# Patient Record
Sex: MALE | Race: WHITE | NOT HISPANIC OR LATINO | Employment: FULL TIME | URBAN - METROPOLITAN AREA
[De-identification: names, ages, dates, MRNs, and addresses within clinical notes are randomized per-mention and may not be internally consistent; named-entity substitution may affect disease eponyms.]

---

## 2018-08-10 ENCOUNTER — OFFICE VISIT (OUTPATIENT)
Dept: FAMILY MEDICINE CLINIC | Facility: CLINIC | Age: 23
End: 2018-08-10
Payer: COMMERCIAL

## 2018-08-10 ENCOUNTER — LAB (OUTPATIENT)
Dept: LAB | Facility: CLINIC | Age: 23
End: 2018-08-10
Payer: COMMERCIAL

## 2018-08-10 VITALS
HEART RATE: 61 BPM | OXYGEN SATURATION: 97 % | BODY MASS INDEX: 25.15 KG/M2 | TEMPERATURE: 97.7 F | DIASTOLIC BLOOD PRESSURE: 64 MMHG | WEIGHT: 169.8 LBS | SYSTOLIC BLOOD PRESSURE: 110 MMHG | HEIGHT: 69 IN

## 2018-08-10 DIAGNOSIS — Z13.1 DIABETES MELLITUS SCREENING: ICD-10-CM

## 2018-08-10 DIAGNOSIS — R25.1 OCCASIONAL TREMORS: Primary | ICD-10-CM

## 2018-08-10 DIAGNOSIS — R10.9 ABDOMINAL CRAMPS: ICD-10-CM

## 2018-08-10 DIAGNOSIS — R25.1 OCCASIONAL TREMORS: ICD-10-CM

## 2018-08-10 DIAGNOSIS — Z13.220 NEED FOR LIPID SCREENING: ICD-10-CM

## 2018-08-10 LAB
ALBUMIN SERPL BCP-MCNC: 4.1 G/DL (ref 3.5–5)
ALP SERPL-CCNC: 54 U/L (ref 46–116)
ALT SERPL W P-5'-P-CCNC: 23 U/L (ref 12–78)
ANION GAP SERPL CALCULATED.3IONS-SCNC: 8 MMOL/L (ref 4–13)
AST SERPL W P-5'-P-CCNC: 11 U/L (ref 5–45)
BASOPHILS # BLD AUTO: 0.02 THOUSANDS/ΜL (ref 0–0.1)
BASOPHILS NFR BLD AUTO: 0 % (ref 0–1)
BILIRUB SERPL-MCNC: 0.47 MG/DL (ref 0.2–1)
BUN SERPL-MCNC: 13 MG/DL (ref 5–25)
CALCIUM SERPL-MCNC: 9.3 MG/DL (ref 8.3–10.1)
CHLORIDE SERPL-SCNC: 106 MMOL/L (ref 100–108)
CHOLEST SERPL-MCNC: 142 MG/DL (ref 50–200)
CO2 SERPL-SCNC: 27 MMOL/L (ref 21–32)
CREAT SERPL-MCNC: 0.9 MG/DL (ref 0.6–1.3)
EOSINOPHIL # BLD AUTO: 0.23 THOUSAND/ΜL (ref 0–0.61)
EOSINOPHIL NFR BLD AUTO: 3 % (ref 0–6)
ERYTHROCYTE [DISTWIDTH] IN BLOOD BY AUTOMATED COUNT: 12.3 % (ref 11.6–15.1)
GFR SERPL CREATININE-BSD FRML MDRD: 121 ML/MIN/1.73SQ M
GLUCOSE P FAST SERPL-MCNC: 90 MG/DL (ref 65–99)
HCT VFR BLD AUTO: 45.6 % (ref 36.5–49.3)
HDLC SERPL-MCNC: 52 MG/DL (ref 40–60)
HGB BLD-MCNC: 15.1 G/DL (ref 12–17)
IMM GRANULOCYTES # BLD AUTO: 0.01 THOUSAND/UL (ref 0–0.2)
IMM GRANULOCYTES NFR BLD AUTO: 0 % (ref 0–2)
LDLC SERPL CALC-MCNC: 74 MG/DL (ref 0–100)
LYMPHOCYTES # BLD AUTO: 2.29 THOUSANDS/ΜL (ref 0.6–4.47)
LYMPHOCYTES NFR BLD AUTO: 34 % (ref 14–44)
MCH RBC QN AUTO: 30.1 PG (ref 26.8–34.3)
MCHC RBC AUTO-ENTMCNC: 33.1 G/DL (ref 31.4–37.4)
MCV RBC AUTO: 91 FL (ref 82–98)
MONOCYTES # BLD AUTO: 0.56 THOUSAND/ΜL (ref 0.17–1.22)
MONOCYTES NFR BLD AUTO: 8 % (ref 4–12)
NEUTROPHILS # BLD AUTO: 3.59 THOUSANDS/ΜL (ref 1.85–7.62)
NEUTS SEG NFR BLD AUTO: 55 % (ref 43–75)
NONHDLC SERPL-MCNC: 90 MG/DL
NRBC BLD AUTO-RTO: 0 /100 WBCS
PLATELET # BLD AUTO: 258 THOUSANDS/UL (ref 149–390)
PMV BLD AUTO: 10.3 FL (ref 8.9–12.7)
POTASSIUM SERPL-SCNC: 3.8 MMOL/L (ref 3.5–5.3)
PROT SERPL-MCNC: 7.4 G/DL (ref 6.4–8.2)
RBC # BLD AUTO: 5.01 MILLION/UL (ref 3.88–5.62)
SODIUM SERPL-SCNC: 141 MMOL/L (ref 136–145)
TRIGL SERPL-MCNC: 78 MG/DL
TSH SERPL DL<=0.05 MIU/L-ACNC: 2.3 UIU/ML
WBC # BLD AUTO: 6.7 THOUSAND/UL (ref 4.31–10.16)

## 2018-08-10 PROCEDURE — 3008F BODY MASS INDEX DOCD: CPT | Performed by: FAMILY MEDICINE

## 2018-08-10 PROCEDURE — 99204 OFFICE O/P NEW MOD 45 MIN: CPT | Performed by: FAMILY MEDICINE

## 2018-08-10 PROCEDURE — 36415 COLL VENOUS BLD VENIPUNCTURE: CPT

## 2018-08-10 PROCEDURE — 80061 LIPID PANEL: CPT

## 2018-08-10 PROCEDURE — 85025 COMPLETE CBC W/AUTO DIFF WBC: CPT

## 2018-08-10 PROCEDURE — 84443 ASSAY THYROID STIM HORMONE: CPT

## 2018-08-10 PROCEDURE — 80053 COMPREHEN METABOLIC PANEL: CPT

## 2018-08-10 RX ORDER — ALUMINUM ZIRCONIUM OCTACHLOROHYDREX GLY 16 G/100G
1 GEL TOPICAL DAILY
Qty: 30 CAPSULE | Refills: 1 | Status: SHIPPED | OUTPATIENT
Start: 2018-08-10 | End: 2018-09-09

## 2018-08-10 NOTE — PROGRESS NOTES
Assessment/Plan:    No problem-specific Assessment & Plan notes found for this encounter  Diagnoses and all orders for this visit:    Occasional tremors  Unclear etiology possibly related to familial tremors  After discussing with patient will order blood work at this time  He is not interested in medications for it at this time  He says the tremor is not bothersome to him at this time  -     CBC and differential; Future  -     TSH baseline; Future    Abdominal cramps  Possibly related to IBs  Advised to try probiotics  -     bifidobacterium infantis (ALIGN) capsule; Take 1 capsule by mouth daily for 30 days    Diabetes mellitus screening  -     Comprehensive metabolic panel; Future    Need for lipid screening  -     Lipid panel; Future      follow-up as needed  Subjective:      Patient ID: Armen Harrington is a 25 y o  male  Patient is here to establish care  He said he has been having tremor of both hands have a past several months  He denies any family history of essential tremor or Parkinson's disease  He says that the tremor is not bothersome to him however his mom is worried about it  Also he has been having abdominal cramps that come and go  He denies any eye changes in bowel habits related to this  He denies any constipation diarrhea blood in the stool associated with it  He denies any nausea vomiting associated with it  The following portions of the patient's history were reviewed and updated as appropriate:   He  has no past medical history on file  He   Patient Active Problem List    Diagnosis Date Noted    Occasional tremors 08/10/2018    Abdominal cramps 08/10/2018    Diabetes mellitus screening 08/10/2018    Need for lipid screening 08/10/2018     He  has no past surgical history on file  His family history includes Heart disease in his father; No Known Problems in his mother  He  reports that he has been smoking Cigarettes    He has a 2 50 pack-year smoking history  He has never used smokeless tobacco  His alcohol and drug histories are not on file  Current Outpatient Prescriptions   Medication Sig Dispense Refill    bifidobacterium infantis (ALIGN) capsule Take 1 capsule by mouth daily for 30 days 30 capsule 1     No current facility-administered medications for this visit  No current outpatient prescriptions on file prior to visit  No current facility-administered medications on file prior to visit  He has No Known Allergies       Review of Systems   Constitutional: Negative for activity change, appetite change, fatigue and fever  HENT: Negative for congestion and ear discharge  Respiratory: Negative for cough and shortness of breath  Cardiovascular: Negative for chest pain and palpitations  Gastrointestinal: Positive for abdominal pain  Negative for diarrhea and nausea  Musculoskeletal: Negative for arthralgias and back pain  Skin: Negative for color change and rash  Neurological: Positive for tremors  Negative for dizziness and headaches  Psychiatric/Behavioral: Negative for agitation and behavioral problems  Objective:      /64   Pulse 61   Temp 97 7 °F (36 5 °C)   Ht 5' 9" (1 753 m)   Wt 77 kg (169 lb 12 8 oz)   SpO2 97%   BMI 25 08 kg/m²          Physical Exam   Constitutional: He is oriented to person, place, and time  He appears well-developed and well-nourished  No distress  Eyes: Pupils are equal, round, and reactive to light  No scleral icterus  Cardiovascular: Normal rate, regular rhythm and normal heart sounds  No murmur heard  Pulmonary/Chest: Effort normal and breath sounds normal  No respiratory distress  He has no wheezes  Abdominal: Soft  Bowel sounds are normal  He exhibits no distension  There is no tenderness  Neurological: He is alert and oriented to person, place, and time  Slight tremor of right hand noted  Skin: Skin is warm and dry  No rash noted  He is not diaphoretic  Psychiatric: He has a normal mood and affect

## 2019-11-02 ENCOUNTER — APPOINTMENT (EMERGENCY)
Dept: RADIOLOGY | Facility: HOSPITAL | Age: 24
End: 2019-11-02
Payer: COMMERCIAL

## 2019-11-02 ENCOUNTER — HOSPITAL ENCOUNTER (EMERGENCY)
Facility: HOSPITAL | Age: 24
Discharge: HOME | End: 2019-11-02
Attending: EMERGENCY MEDICINE | Admitting: EMERGENCY MEDICINE
Payer: COMMERCIAL

## 2019-11-02 VITALS
HEART RATE: 70 BPM | SYSTOLIC BLOOD PRESSURE: 124 MMHG | RESPIRATION RATE: 18 BRPM | BODY MASS INDEX: 24.44 KG/M2 | DIASTOLIC BLOOD PRESSURE: 80 MMHG | HEIGHT: 69 IN | OXYGEN SATURATION: 97 % | WEIGHT: 165 LBS | TEMPERATURE: 97.3 F

## 2019-11-02 DIAGNOSIS — S89.91XA RIGHT KNEE INJURY, INITIAL ENCOUNTER: Primary | ICD-10-CM

## 2019-11-02 PROCEDURE — 99283 EMERGENCY DEPT VISIT LOW MDM: CPT

## 2019-11-02 PROCEDURE — 73564 X-RAY EXAM KNEE 4 OR MORE: CPT

## 2019-11-02 PROCEDURE — 99284 EMERGENCY DEPT VISIT MOD MDM: CPT | Performed by: PHYSICIAN ASSISTANT

## 2019-11-03 NOTE — ED PROVIDER NOTES
History  Chief Complaint   Patient presents with    Knee Pain     hx dislocation - states feels like it dislocated but feels like it popped back in; right knee     A 51-year-old male here for right knee injury  He has history of patellar dislocations and today provided on the right knee feeling a pop and then fell to the ground onto the knee  States his knee cap "went sideways"  Pain since then  Ambulance was initially called and when they arrived they straightened out his leg which reduced his patella  He is feeling better but continues to have pain and swelling prompting his visit  Denies any numbness tingling or weakness  He still has full range of motion of the knee albeit pain in doing so  History provided by:  Patient   used: No    Knee Pain   Location:  Knee  Time since incident:  1 hour  Injury: yes    Mechanism of injury comment:  External rotation of the knee  Knee location:  R knee  Pain details:     Quality:  Aching    Radiates to:  Does not radiate    Severity:  Moderate    Onset quality:  Sudden    Timing:  Constant    Progression:  Improving  Chronicity:  New  Dislocation: yes (Patella)    Foreign body present:  No foreign bodies  Prior injury to area:  Yes (Prior patellar dislocation)  Relieved by:  Rest  Worsened by:  Bearing weight, extension and flexion  Ineffective treatments:  None tried  Associated symptoms: no back pain, no decreased ROM, no fatigue, no fever, no itching, no muscle weakness, no neck pain, no numbness, no stiffness, no swelling and no tingling    Risk factors: no concern for non-accidental trauma, no frequent fractures, no known bone disorder, no obesity and no recent illness        None       History reviewed  No pertinent past medical history  History reviewed  No pertinent surgical history      Family History   Problem Relation Age of Onset    No Known Problems Mother     Heart disease Father      I have reviewed and agree with the history as documented  Social History     Tobacco Use    Smoking status: Current Every Day Smoker     Packs/day: 0 50     Years: 5 00     Pack years: 2 50     Types: Cigarettes    Smokeless tobacco: Never Used   Substance Use Topics    Alcohol use: Yes     Alcohol/week: 6 0 standard drinks     Types: 6 Standard drinks or equivalent per week     Frequency: Never    Drug use: Not on file        Review of Systems   Constitutional: Negative for activity change, appetite change, chills, diaphoresis, fatigue, fever and unexpected weight change  HENT: Negative for congestion, rhinorrhea, sinus pressure, sore throat and trouble swallowing  Eyes: Negative for photophobia and visual disturbance  Respiratory: Negative for apnea, cough, choking, chest tightness, shortness of breath, wheezing and stridor  Cardiovascular: Negative for chest pain, palpitations and leg swelling  Gastrointestinal: Negative for abdominal distention, abdominal pain, blood in stool, constipation, diarrhea, nausea and vomiting  Genitourinary: Negative for decreased urine volume, difficulty urinating, dysuria, enuresis, flank pain, frequency, hematuria and urgency  Musculoskeletal: Negative for arthralgias, back pain, myalgias, neck pain, neck stiffness and stiffness  Skin: Negative for color change, itching, pallor, rash and wound  Allergic/Immunologic: Negative  Neurological: Negative for dizziness, tremors, syncope, weakness, light-headedness, numbness and headaches  Hematological: Negative  Psychiatric/Behavioral: Negative  All other systems reviewed and are negative  Physical Exam  Physical Exam   Constitutional: He is oriented to person, place, and time  He appears well-developed and well-nourished  Non-toxic appearance  He does not have a sickly appearance  He does not appear ill  No distress  HENT:   Head: Normocephalic and atraumatic  Eyes: Pupils are equal, round, and reactive to light   EOM and lids are normal    Neck: Normal range of motion  Neck supple  Cardiovascular: Normal rate, regular rhythm, S1 normal, S2 normal, normal heart sounds, intact distal pulses and normal pulses  Exam reveals no gallop, no distant heart sounds, no friction rub and no decreased pulses  No murmur heard  Pulses:       Radial pulses are 2+ on the right side, and 2+ on the left side  Pulmonary/Chest: Effort normal and breath sounds normal  No accessory muscle usage  No apnea, no tachypnea and no bradypnea  No respiratory distress  He has no decreased breath sounds  He has no wheezes  He has no rhonchi  He has no rales  Abdominal: Soft  Normal appearance  He exhibits no distension  There is no tenderness  There is no rigidity, no rebound and no guarding  Musculoskeletal: Normal range of motion  He exhibits no edema or deformity  Right knee: He exhibits bony tenderness  He exhibits normal range of motion, no swelling, no effusion and no ecchymosis  Tenderness found  Medial joint line and MCL tenderness noted  No lateral joint line, no LCL and no patellar tendon tenderness noted  Legs:  Full range of motion of the right knee including full flexion and extension against resistance  No swelling  No effusion  Neurovascular intact distally  Neurological: He is alert and oriented to person, place, and time  No cranial nerve deficit  GCS eye subscore is 4  GCS verbal subscore is 5  GCS motor subscore is 6  Skin: Skin is warm, dry and intact  No rash noted  He is not diaphoretic  No erythema  No pallor  Psychiatric: His speech is normal    Nursing note and vitals reviewed        Vital Signs  ED Triage Vitals [11/02/19 2223]   Temperature Pulse Respirations Blood Pressure SpO2   (!) 97 3 °F (36 3 °C) 70 18 124/80 97 %      Temp Source Heart Rate Source Patient Position - Orthostatic VS BP Location FiO2 (%)   Oral Monitor Sitting Right arm --      Pain Score       5           Vitals:    11/02/19 2223   BP: 124/80 Pulse: 70   Patient Position - Orthostatic VS: Sitting         Visual Acuity      ED Medications  Medications - No data to display    Diagnostic Studies  Results Reviewed     None                 XR knee 4+ vw right injury    (Results Pending)              Procedures  Procedures       ED Course                               MDM  Number of Diagnoses or Management Options  Right knee injury, initial encounter: new and requires workup  Diagnosis management comments: Differential diagnosis including but not limited to: sprain, strain, fracture, dislocation, contusion  Plan: XR  dispo pending  Amount and/or Complexity of Data Reviewed  Tests in the radiology section of CPT®: ordered and reviewed  Independent visualization of images, tracings, or specimens: yes    Risk of Complications, Morbidity, and/or Mortality  Presenting problems: low  Management options: low  General comments: 26 yo with right knee injury  XR normal  Based on what he describes as well as mechanism described (external rotation, minor mechanism), and previous history of similar this was likely a patellar dislocation which spontaneously reduced  He's feeling better  Normal neurovascular exam with 2+ DP pulse  Doubt knee dislocation  Recommended RICE  Knee immobilizer and crutches provided  NVI afterwards  Recommended f/u with ortho  Return parameters provided  Pt understands and agrees with plan  Patient Progress  Patient progress: stable      Disposition  Final diagnoses:   Right knee injury, initial encounter     Time reflects when diagnosis was documented in both MDM as applicable and the Disposition within this note     Time User Action Codes Description Comment    11/2/2019 10:42 PM Valeria Ranks Add [[id number]] Right knee injury, initial encounter       ED Disposition     ED Disposition Condition Date/Time Comment    Discharge Stable Sat Nov 2, 2019 10:42 PM Anikta Tirado discharge to home/self care              Follow-up Information     Follow up With Specialties Details Why Contact Info Additional [address] [facility] Orthopedic Surgery Call   [address] [facility]  [id number] [facility], [address], [place], [place], [address] [phone]          Patient's Medications    No medications on file         ED Provider  Electronically Signed by           Karen Maharaj PA-C  11/02/19 4230

## 2019-11-11 ENCOUNTER — OFFICE VISIT (OUTPATIENT)
Dept: OBGYN CLINIC | Facility: CLINIC | Age: 24
End: 2019-11-11
Payer: COMMERCIAL

## 2019-11-11 VITALS
SYSTOLIC BLOOD PRESSURE: 129 MMHG | HEART RATE: 102 BPM | BODY MASS INDEX: 24.44 KG/M2 | HEIGHT: 69 IN | WEIGHT: 165 LBS | DIASTOLIC BLOOD PRESSURE: 74 MMHG

## 2019-11-11 DIAGNOSIS — S89.91XA RIGHT KNEE INJURY, INITIAL ENCOUNTER: ICD-10-CM

## 2019-11-11 DIAGNOSIS — S83.004A PATELLAR DISLOCATION, RIGHT, INITIAL ENCOUNTER: Primary | ICD-10-CM

## 2019-11-11 PROCEDURE — 99243 OFF/OP CNSLTJ NEW/EST LOW 30: CPT | Performed by: FAMILY MEDICINE

## 2019-11-11 NOTE — LETTER
November 11, 2019     Ariana Blackman, [address]  [address] [facility]  [address]    Patient: Brooklyn Chandra   YOB: 1995   Date of Visit: 11/11/2019       Dear Dr Miroslava Young:    Thank you for referring Micah Caldwell to me for evaluation  Below are my notes for this consultation  If you have questions, please do not hesitate to call me  I look forward to following your patient along with you  Sincerely,        [employer], DO        CC: No Recipients  [employer], DO  11/11/2019  5:18 PM  Sign at close encounter  Assessment/Plan:  Assessment/Plan   Diagnoses and all orders for this visit:    Patellar dislocation, right, initial encounter  -     MRI knee right  wo contrast; Future    Right knee injury, initial encounter  -     MRI knee right  wo contrast; Future      28-year-old active male with right knee pain and swelling from twisting injury on 11/02/2019  Discussed with patient physical exam, radiographs, impression and plan  X-rays of the right knee are unremarkable for acute osseous abnormality  Physical exam is noted for effusion of the knee with tenderness of the MPFL, patellar undersurface, and medial femoral condyle  He has range of motion limited to extension -5° and flexion to 90°  There is positive Donna's at the medial aspect knee positive patellar apprehension, and positive patellar inhibition and grind  He has normal dorsalis pedis pulse and sensation in the right lower extremity  He is status post patellar dislocation, with this event being his 4th dislocation  At this time I will refer him for MRI of the knee to evaluate for internal derangement including articular cartilage defect/loose body, as invasive management may be warranted  He is to continue use of immobilizer but may bear weight as tolerated  He will follow up after getting MRI done  Subjective:   Patient ID: Brooklyn Chandra is a 25 y o  male    Chief Complaint   Patient presents with   [facility] Right Knee - Follow-up, Pain, Swelling       24-year-old active male presents for evaluation of right knee pain and swelling of onset from twisting injury on 11/02/2019  He reports that while walking through [place] he planted his right lower extremity and pivoted and his knee twisted and gave out  He felt his kneecap pop out to the lateral aspect of the knee and he fell to the ground  He has pain that was described as sudden onset, sharp, localized to the knee but worse at the lateral aspect, severe in intensity, radiating distally along the lateral aspect of lower leg, associated with swelling, worse with direct pressure and attempt of bending the knee, and improved with stability  EMS services were contacted and upon transferring him his knee cap reduced  He was evaluated in emergency room where x-ray evaluation unremarkable  He was provided with knee immobilizer, given crutches, and referred to orthopedic care  He reports having previous dislocations with this being his 4th event, and states that during 1 event he had to be sedated in order for the kneecap to be reduced  He reports that with the knee immobilized pain is controlled  Knee Pain   This is a new problem  The current episode started 1 to 4 weeks ago  The problem occurs intermittently  The problem has been gradually improving  Associated symptoms include arthralgias and joint swelling  Pertinent negatives include no abdominal pain, chest pain, chills, fever, numbness, rash, sore throat or weakness  The symptoms are aggravated by bending  He has tried rest, NSAIDs and immobilization for the symptoms  The treatment provided mild relief  The following portions of the patient's history were reviewed and updated as appropriate: He  has no past medical history on file  He  has no past surgical history on file  His family history includes Heart disease in his father; No Known Problems in his mother    He  reports that he has been smoking cigarettes  He has a 2 50 pack-year smoking history  He has never used smokeless tobacco  He reports that he drinks about 6 0 standard drinks of alcohol per week  His drug history is not on file  He has No Known Allergies       Review of Systems   Constitutional: Negative for chills and fever  HENT: Negative for sore throat  Eyes: Negative for visual disturbance  Respiratory: Negative for shortness of breath  Cardiovascular: Negative for chest pain  Gastrointestinal: Negative for abdominal pain  Genitourinary: Negative for flank pain  Musculoskeletal: Positive for arthralgias and joint swelling  Skin: Negative for rash and wound  Neurological: Negative for weakness and numbness  Hematological: Does not bruise/bleed easily  Psychiatric/Behavioral: Negative for self-injury  Objective:  Vitals:    11/11/19 1335   BP: 129/74   Pulse: 102   Weight: 74 8 kg (165 lb)   Height: 5' 9" (1 753 m)     Right Ankle Exam     Muscle Strength   Dorsiflexion:  5/5  Plantar flexion:  5/5    Other   Pulse: present       Right Knee Exam     Muscle Strength   The patient has normal right knee strength  Tenderness   The patient is experiencing tenderness in the medial retinaculum and medial joint line (Patellar undersurface, medial femoral condyle)  Range of Motion   Extension: -5   Flexion: 90     Tests   Donna:  Medial - positive   Varus: negative Valgus: negative  Patellar apprehension: positive    Other   Swelling: moderate  Effusion: effusion present    Comments:  Positive patellar inhibition and grind      Right Hip Exam     Muscle Strength   Flexion: 5/5     Tests   ZULEIKA: negative    Comments:  Negative FADDIR          Observations     Right Knee   Positive for effusion  Strength/Myotome Testing     Right Ankle/Foot   Dorsiflexion: 5  Plantar flexion: 5      Physical Exam   Constitutional: He is oriented to person, place, and time  He appears well-developed  No distress     HENT: Head: Normocephalic and atraumatic  Eyes: Conjunctivae are normal    Neck: No tracheal deviation present  Cardiovascular: Normal rate  Pulmonary/Chest: Effort normal  No respiratory distress  Abdominal: He exhibits no distension  Musculoskeletal:        Right knee: He exhibits effusion  Neurological: He is alert and oriented to person, place, and time  Skin: Skin is warm and dry  Psychiatric: He has a normal mood and affect  His behavior is normal    Nursing note and vitals reviewed  I have personally reviewed pertinent films in PACS and my interpretation is No acute osseous abnormality of the right knee

## 2019-11-11 NOTE — LETTER
November 11, 2019     Patient: Robin Heimlich   YOB: 1995   Date of Visit: 11/11/2019       To Whom it May Concern:    Quinn Mayfieldcolin is under my professional care  He was seen in my office on 11/11/2019  He may work with restrictions:  -He is restricted to sedentary work  He is being referred for MRI and will be re-assessed after  If you have any questions or concerns, please don't hesitate to call           Sincerely,          [employer], DO        CC: No Recipients

## 2019-11-11 NOTE — PROGRESS NOTES
Assessment/Plan:  Assessment/Plan   Diagnoses and all orders for this visit:    Patellar dislocation, right, initial encounter  -     MRI knee right  wo contrast; Future    Right knee injury, initial encounter  -     MRI knee right  wo contrast; Future      49-year-old active male with right knee pain and swelling from twisting injury on 11/02/2019  Discussed with patient physical exam, radiographs, impression and plan  X-rays of the right knee are unremarkable for acute osseous abnormality  Physical exam is noted for effusion of the knee with tenderness of the MPFL, patellar undersurface, and medial femoral condyle  He has range of motion limited to extension -5° and flexion to 90°  There is positive Donna's at the medial aspect knee positive patellar apprehension, and positive patellar inhibition and grind  He has normal dorsalis pedis pulse and sensation in the right lower extremity  He is status post patellar dislocation, with this event being his 4th dislocation  At this time I will refer him for MRI of the knee to evaluate for internal derangement including articular cartilage defect/loose body, as invasive management may be warranted  He is to continue use of immobilizer but may bear weight as tolerated  He will follow up after getting MRI done  Subjective:   Patient ID: Leland Soto is a 25 y o  male  Chief Complaint   Patient presents with    Right Knee - Follow-up, Pain, Swelling       49-year-old active male presents for evaluation of right knee pain and swelling of onset from twisting injury on 11/02/2019  He reports that while walking through [place] he planted his right lower extremity and pivoted and his knee twisted and gave out  He felt his kneecap pop out to the lateral aspect of the knee and he fell to the ground    He has pain that was described as sudden onset, sharp, localized to the knee but worse at the lateral aspect, severe in intensity, radiating distally along the lateral aspect of lower leg, associated with swelling, worse with direct pressure and attempt of bending the knee, and improved with stability  EMS services were contacted and upon transferring him his knee cap reduced  He was evaluated in emergency room where x-ray evaluation unremarkable  He was provided with knee immobilizer, given crutches, and referred to orthopedic care  He reports having previous dislocations with this being his 4th event, and states that during 1 event he had to be sedated in order for the kneecap to be reduced  He reports that with the knee immobilized pain is controlled  Knee Pain   This is a new problem  The current episode started 1 to 4 weeks ago  The problem occurs intermittently  The problem has been gradually improving  Associated symptoms include arthralgias and joint swelling  Pertinent negatives include no abdominal pain, chest pain, chills, fever, numbness, rash, sore throat or weakness  The symptoms are aggravated by bending  He has tried rest, NSAIDs and immobilization for the symptoms  The treatment provided mild relief  The following portions of the patient's history were reviewed and updated as appropriate: He  has no past medical history on file  He  has no past surgical history on file  His family history includes Heart disease in his father; No Known Problems in his mother  He  reports that he has been smoking cigarettes  He has a 2 50 pack-year smoking history  He has never used smokeless tobacco  He reports that he drinks about 6 0 standard drinks of alcohol per week  His drug history is not on file  He has No Known Allergies       Review of Systems   Constitutional: Negative for chills and fever  HENT: Negative for sore throat  Eyes: Negative for visual disturbance  Respiratory: Negative for shortness of breath  Cardiovascular: Negative for chest pain  Gastrointestinal: Negative for abdominal pain     Genitourinary: Negative for flank pain    Musculoskeletal: Positive for arthralgias and joint swelling  Skin: Negative for rash and wound  Neurological: Negative for weakness and numbness  Hematological: Does not bruise/bleed easily  Psychiatric/Behavioral: Negative for self-injury  Objective:  Vitals:    11/11/19 1335   BP: 129/74   Pulse: 102   Weight: 74 8 kg (165 lb)   Height: 5' 9" (1 753 m)     Right Ankle Exam     Muscle Strength   Dorsiflexion:  5/5  Plantar flexion:  5/5    Other   Pulse: present       Right Knee Exam     Muscle Strength   The patient has normal right knee strength  Tenderness   The patient is experiencing tenderness in the medial retinaculum and medial joint line (Patellar undersurface, medial femoral condyle)  Range of Motion   Extension: -5   Flexion: 90     Tests   Donna:  Medial - positive   Varus: negative Valgus: negative  Patellar apprehension: positive    Other   Swelling: moderate  Effusion: effusion present    Comments:  Positive patellar inhibition and grind      Right Hip Exam     Muscle Strength   Flexion: 5/5     Tests   ZULEIKA: negative    Comments:  Negative FADDIR          Observations     Right Knee   Positive for effusion  Strength/Myotome Testing     Right Ankle/Foot   Dorsiflexion: 5  Plantar flexion: 5      Physical Exam   Constitutional: He is oriented to person, place, and time  He appears well-developed  No distress  HENT:   Head: Normocephalic and atraumatic  Eyes: Conjunctivae are normal    Neck: No tracheal deviation present  Cardiovascular: Normal rate  Pulmonary/Chest: Effort normal  No respiratory distress  Abdominal: He exhibits no distension  Musculoskeletal:        Right knee: He exhibits effusion  Neurological: He is alert and oriented to person, place, and time  Skin: Skin is warm and dry  Psychiatric: He has a normal mood and affect  His behavior is normal    Nursing note and vitals reviewed        I have personally reviewed pertinent films in PACS and my interpretation is No acute osseous abnormality of the right knee

## 2019-11-13 ENCOUNTER — HOSPITAL ENCOUNTER (OUTPATIENT)
Dept: RADIOLOGY | Facility: IMAGING CENTER | Age: 24
Discharge: HOME | End: 2019-11-13
Attending: FAMILY MEDICINE
Payer: COMMERCIAL

## 2019-11-13 DIAGNOSIS — S89.91XA RIGHT KNEE INJURY, INITIAL ENCOUNTER: ICD-10-CM

## 2019-11-13 DIAGNOSIS — S83.004A PATELLAR DISLOCATION, RIGHT, INITIAL ENCOUNTER: ICD-10-CM

## 2019-11-13 PROCEDURE — 73721 MRI JNT OF LWR EXTRE W/O DYE: CPT

## 2019-11-18 ENCOUNTER — OFFICE VISIT (OUTPATIENT)
Dept: OBGYN CLINIC | Facility: CLINIC | Age: 24
End: 2019-11-18
Payer: COMMERCIAL

## 2019-11-18 VITALS
DIASTOLIC BLOOD PRESSURE: 78 MMHG | BODY MASS INDEX: 28.14 KG/M2 | HEART RATE: 72 BPM | HEIGHT: 69 IN | WEIGHT: 190 LBS | SYSTOLIC BLOOD PRESSURE: 122 MMHG

## 2019-11-18 DIAGNOSIS — S83.004D PATELLAR DISLOCATION, RIGHT, SUBSEQUENT ENCOUNTER: Primary | ICD-10-CM

## 2019-11-18 DIAGNOSIS — T14.8XXA CONTUSION OF BONE: ICD-10-CM

## 2019-11-18 DIAGNOSIS — M25.361 PATELLAR INSTABILITY OF RIGHT KNEE: ICD-10-CM

## 2019-11-18 DIAGNOSIS — S83.8X1D SPRAIN OF OTHER LIGAMENT OF RIGHT KNEE, SUBSEQUENT ENCOUNTER: ICD-10-CM

## 2019-11-18 PROCEDURE — 99213 OFFICE O/P EST LOW 20 MIN: CPT | Performed by: FAMILY MEDICINE

## 2019-11-18 RX ORDER — NAPROXEN 500 MG/1
500 TABLET ORAL 2 TIMES DAILY WITH MEALS
Qty: 30 TABLET | Refills: 0 | Status: SHIPPED | OUTPATIENT
Start: 2019-11-18

## 2019-11-18 NOTE — PROGRESS NOTES
Assessment/Plan:  Assessment/Plan   Diagnoses and all orders for this visit:    Patellar dislocation, right, subsequent encounter  -     Brace  -     Ambulatory referral to Physical Therapy; Future    Sprain of other ligament of right knee, subsequent encounter  -     naproxen (NAPROSYN) 500 mg tablet; Take 1 tablet (500 mg total) by mouth 2 (two) times a day with meals  -     Ambulatory referral to Physical Therapy; Future    Contusion of bone  -     naproxen (NAPROSYN) 500 mg tablet; Take 1 tablet (500 mg total) by mouth 2 (two) times a day with meals  -     Ambulatory referral to Physical Therapy; Future    Patellar instability of right knee  -     Ambulatory referral to Physical Therapy; Future        49-year-old active male status post right knee patellar dislocation from twisting injury on 11/02/2019  Discussed with patient MRI results, impression and plan  MRI of the right knee is noted for bone contusions lateral femoral condyle and medial aspect of the patella, with diffuse thickening of the medial patellofemoral ligament and partial tear of the patellar and femoral attachments  He does have shallow femoral trochlea and Wiberg type 2 patella  Physical exam noted for mild effusion around the knee  There is only mild tenderness upon palpation of the lateral femoral condyle and over the MPFL  He has extension limited to -5°  There is mild patellar apprehension  Clinical impression is already improving  I discussed regimen of anti-inflammatory, bracing, and physical therapy to which he agreed  He is to take naproxen 500 mg twice daily food consistently 2 weeks, take tumeric 500 mg twice daily, I provided with patellar stabilizing knee brace, he is to start physical therapy as soon as possible and do home exercises as directed, and he will follow up in 5 weeks at which point he will be re-evaluated  Subjective:   Patient ID: Hien Sprain is a 25 y o  male    Chief Complaint   Patient presents with  Right Knee - Pain, Swelling, Follow-up     70-year-old male following up for right knee pain after sustaining patellar dislocation on 11/02/2019  He was last seen 1 week ago at which point he was referred for MRI of the knee and advised to continue with knee immobilizer  He reports improvement since his last visit  Pain described as generalized to the knee but worse at the lateral and medial aspects, intermittent, achy and sore, nonradiating, associated swelling, worse with activity, and improved with rest   He has been better able to bear weight and ambulate, and bend the knee  He has not had any new injury since his last visit  Knee Pain   This is a new problem  The current episode started 1 to 4 weeks ago  The problem occurs intermittently  The problem has been gradually improving  Associated symptoms include arthralgias and joint swelling  Pertinent negatives include no numbness or weakness  The symptoms are aggravated by twisting, standing and walking  He has tried rest and immobilization for the symptoms  The treatment provided mild relief  Review of Systems   Musculoskeletal: Positive for arthralgias and joint swelling  Neurological: Negative for weakness and numbness  Objective:  Vitals:    11/18/19 1337   BP: 122/78   Pulse: 72   Weight: 86 2 kg (190 lb)   Height: 5' 9" (1 753 m)     Right Knee Exam     Muscle Strength   The patient has normal right knee strength  Tenderness   The patient is experiencing tenderness in the medial retinaculum and lateral joint line (Lateral femoral condyle, patellar undersurface)  Range of Motion   Extension: -5     Tests   Patellar apprehension: positive    Other   Swelling: mild            Physical Exam   Constitutional: He is oriented to person, place, and time  He appears well-developed  No distress  HENT:   Head: Normocephalic and atraumatic  Eyes: Conjunctivae are normal    Neck: No tracheal deviation present     Cardiovascular: Normal rate  Pulmonary/Chest: Effort normal  No respiratory distress  Abdominal: He exhibits no distension  Neurological: He is alert and oriented to person, place, and time  Skin: Skin is warm and dry  Psychiatric: He has a normal mood and affect  His behavior is normal    Nursing note and vitals reviewed  I have personally reviewed pertinent films in PACS and my interpretation is Lateral femoral condyle bony edema

## 2019-11-18 NOTE — LETTER
November 18, 2019     Patient: Mag Patel   YOB: 1995   Date of Visit: 11/18/2019       To Whom it May Concern:    Caren Jackeline is under my professional care  He was seen in my office on 11/18/2019  He may work while wearing knee brace  He is not to do any squatting  If you have any questions or concerns, please don't hesitate to call           Sincerely,          [place] DO [place]        CC: No Recipients

## 2019-12-10 ENCOUNTER — TELEPHONE (OUTPATIENT)
Dept: OBGYN CLINIC | Facility: HOSPITAL | Age: 24
End: 2019-12-10

## 2019-12-10 NOTE — TELEPHONE ENCOUNTER
Patient sees Dr Umang Riley  He was calling to see if his return to work form could be faxed to his employer (scanned in from 12/2)  He could not locate the fax # but will call back with it

## 2019-12-11 ENCOUNTER — EVALUATION (OUTPATIENT)
Dept: PHYSICAL THERAPY | Facility: CLINIC | Age: 24
End: 2019-12-11
Payer: COMMERCIAL

## 2019-12-11 DIAGNOSIS — M25.361 PATELLAR INSTABILITY OF RIGHT KNEE: ICD-10-CM

## 2019-12-11 DIAGNOSIS — T14.8XXA CONTUSION OF BONE: ICD-10-CM

## 2019-12-11 DIAGNOSIS — S83.004D PATELLAR DISLOCATION, RIGHT, SUBSEQUENT ENCOUNTER: ICD-10-CM

## 2019-12-11 DIAGNOSIS — S83.8X1D SPRAIN OF OTHER LIGAMENT OF RIGHT KNEE, SUBSEQUENT ENCOUNTER: ICD-10-CM

## 2019-12-11 PROCEDURE — 97161 PT EVAL LOW COMPLEX 20 MIN: CPT | Performed by: PHYSICAL THERAPIST

## 2019-12-11 PROCEDURE — 97110 THERAPEUTIC EXERCISES: CPT | Performed by: PHYSICAL THERAPIST

## 2019-12-11 NOTE — PROGRESS NOTES
PT Evaluation     Today's date: 2019  Patient name: Mateo Landeros  : 1995  MRN: [id number]  Referring provider: Amish Valencia DO  Dx:   Encounter Diagnosis     ICD-10-CM    1  Patellar dislocation, right, subsequent encounter S83 004D Ambulatory referral to Physical Therapy   2  Sprain of other ligament of right knee, subsequent encounter S83 8X1D Ambulatory referral to Physical Therapy   3  Contusion of bone T14  8XXA Ambulatory referral to Physical Therapy   4  Patellar instability of right knee [id number] Ambulatory referral to Physical Therapy                  Assessment  Assessment details: Mateo Landeros is a pleasant 25 y o  presenting to physical therapy with MD referral for Patellar dislocation, right, subsequent encounter, Sprain of other ligament of right knee, subsequent encounter, Contusion of bone and Patellar instability of right knee  Problem list:  Minimally limited knee AROM, decreased hip/core strength, limited lower extremity flexibility, and poor squatting mechanics    Treatment to include: Manual therapy techniques, lower extremity/core strengthening, neuromuscular control exercises, balance/proprioception training, squat retraining, instruction in a comprehensive HEP, and modalities as needed  This pt would benefit from skilled PT services to address their impairments and functional limitations to maximize functional outcome  Symptom irritability: lowBarriers to therapy: Chronicity of symptoms (hx of 4 dislocations)  Understanding of Dx/Px/POC: good   Prognosis: good    Goals  ST  Pt will improve hip abduction strength to at least 4/5 in 2 weeks  2  Pt will improve squat mechanics to 90 degrees flexion to minimal to no lateral shift  in 2 weeks  LT  Pt will be able to squat to pick items up from the floor with minimal to no discomfort in 4 weeks  2  Pt will be independent in a comprehensive HEP in 4 weeks      Plan  Patient would benefit from: skilled physical therapy  Frequency: 2x week  Duration in weeks: 4  Plan of Care beginning date: 2019  Plan of Care expiration date: 1/10/2020  Treatment plan discussed with: patient        Subjective Evaluation    History of Present Illness  Mechanism of injury: Pt reports he has dislocated his right patella 4 times since age 13 and had PT one time  Patient reports in early 2019, pt pivoted while walking and his knee cap dislocated  Pt states patella spontaneously relocated; however, pt went to ER for examination where x-rays were taken revealing no fractures  ER referred pt to ortho who ordered MRI of right knee which revealed bone bruising, MPFL partial tear, and joint effusion  Pt was educated to start naproxen and initiate physical therapy  Pt reports knee pain with precipitation; however, no other signficant pain  Pt denies any numbness/tingling in right leg  Pt denies knee locking, buckling, or giving out  Pt reports occasional non-painful clicking in right knee       Premorbid status:  - ADLs: Independent with no difficulty  - Work: Full time, Full duty-  for a [employer]  - Recreation: none    Current status:  - ADLs/Functional activities:   - Stairs Reciprocal pattern with Pain Levels: no pain   - Sit to stand with no pain   - Walking household distances with no pain- has not attempted further   - Standing unlimited without increase in pain   - Sitting unlimited without increase in pain   - Sleeping with 0 nightly sleep disturbances due to pain   - has not attempted running/jumping   - has not attempted pivoting   - Unable to squat due to fear of reinjury  - Work: Unable to work secondary to dysfunction  - Recreation: none  Pain  Current pain ratin  At best pain ratin  At worst pain rating: 3  Location: Surrounding patella  Quality: throbbing and dull ache  Relieving factors: ice and medications  Progression: improved      Diagnostic Tests  X-ray: normal  MRI studies: abnormal  Treatments  Previous treatment: medication  Current treatment: medication and physical therapy  Patient Goals  Patient goals for therapy: decreased pain and return to work  Patient goal: be able to squat confidently again        Objective     Tenderness     Right Knee   No tenderness in the inferior fat pad, inferior patella, lateral patella, lateral retinaculum, medial retinaculum, patellar tendon and quadriceps tendon  Active Range of Motion   Left Knee   Hyperextension  Flexion: 136 degrees   Extension: -7 degrees     Right Knee   Flexion: 132 degrees   Extension: -7 degrees     Mobility   Patellar Mobility:   Left Knee   WFL: medial, superior and inferior  Hypermobile: left lateral      Right Knee   WFL: medial, lateral, superior and inferior    Additional Mobility Details  Laterally facing patella bilaterally    Strength/Myotome Testing     Left Hip   Planes of Motion   Flexion: 5  Abduction: 4-  Adduction: 4  External rotation: 5  Internal rotation: 5    Right Hip   Planes of Motion   Flexion: 4+  Abduction: 4-  Adduction: 4  External rotation: 4 (pain)  Internal rotation: 4 (pain)    Left Knee   Flexion: 5  Extension: 5    Right Knee   Flexion: 4+  Extension: 4+    Left Ankle/Foot   Dorsiflexion: 5  Plantar flexion: 5    Right Ankle/Foot   Dorsiflexion: 4+  Plantar flexion: 4+    Additional Strength Details  Squat: able to squat to floor with bilateral knees moving anterior to toes, bilateral heel raise, and severe lateral shift to left  SLS L: 30 seconds  SLS R: 8 seconds    Flexibility:  - HS: mild restriction  - Gastroc: minimal restriction B        Tests     Left Knee   Negative anterior drawer, lateral Donna, medial Donna, patellar compression, patella-femoral grind, posterior drawer, valgus stress test at 0 degrees, valgus stress test at 30 degrees, varus stress test at 0 degrees and varus stress test at 30 degrees  Right Knee   Positive patella-femoral grind     Negative anterior drawer, lateral Donna, medial Donna, patellar compression, valgus stress test at 0 degrees, valgus stress test at 30 degrees, varus stress test at 0 degrees and varus stress test at 30 degrees  Flowsheet Rows      Most Recent Value   PT/OT G-Codes   Current Score  55   Projected Score  73             Precautions: hx of 4 patellar dislocations      Manual  12-11 (IE)            Not needed- full ROM                                                                     Exercise Diary  12-11 (IE)            NuStep 5 mins, L5 NV                         Standing:             - SLR hip abd with ext 2 x 10 ea RTB NV            - SLR hip ext 2 x 10 ea RTB NV            - SLR hip flexion 2 x 10 ea RTB NV            - front step up 10 x 6" step NV            - lateral step up and over 10 x 6" step NV            - lateral heel tap 10 x 4" step NV            - BIODEX squat 2 mins NV            - front lunges             - lateral lunges             - SLS             - lateral band walks             - retroband walk                          Table:             - LAQ with TB (90-45 deg) 10 x 5" ea RTB NV            - HS curl with TB  10 x 5" ea RTB NV            - SL hip ER with TB 2 x 10 ea RTB NV            - bridges with TB around knees                                           Modalities  12-11 (IE)            Cryo as needed                                         * On initial evaluation, educated pt on anatomy, pathology, and exercise rationale  Provided pt with basic HEP and ensured proper exercise performance  Educated pt to call with any questions or concerns  Access Code: [email or web address]   URL: [email or web address]   Date: 12/11/2019   Prepared by: Leanna Morales      Exercises  · Seated Hamstring Stretch - 4 reps - 30 seconds hold - 3x daily  · Supine Active Straight Leg Raise - 10 reps - 2 sets - 4x weekly  · Sidelying Diagonal Hip Abduction - 10 reps - 2 sets - 4x weekly  · Sidelying Hip Adduction - 10 reps - 2 sets - 4x weekly  · Prone Hip Extension - 10 reps - 2 sets - 4x weekly

## 2019-12-17 ENCOUNTER — OFFICE VISIT (OUTPATIENT)
Dept: PHYSICAL THERAPY | Facility: CLINIC | Age: 24
End: 2019-12-17
Payer: COMMERCIAL

## 2019-12-17 DIAGNOSIS — S83.004D PATELLAR DISLOCATION, RIGHT, SUBSEQUENT ENCOUNTER: Primary | ICD-10-CM

## 2019-12-17 DIAGNOSIS — S89.91XA RIGHT KNEE INJURY, INITIAL ENCOUNTER: ICD-10-CM

## 2019-12-17 DIAGNOSIS — S83.8X1D SPRAIN OF OTHER LIGAMENT OF RIGHT KNEE, SUBSEQUENT ENCOUNTER: ICD-10-CM

## 2019-12-17 DIAGNOSIS — T14.8XXA CONTUSION OF BONE: ICD-10-CM

## 2019-12-17 PROCEDURE — 97530 THERAPEUTIC ACTIVITIES: CPT | Performed by: PHYSICAL THERAPIST

## 2019-12-17 PROCEDURE — 97110 THERAPEUTIC EXERCISES: CPT | Performed by: PHYSICAL THERAPIST

## 2019-12-17 NOTE — PROGRESS NOTES
Daily Note     Today's date: 2019  Patient name: Marj Keating  : 1995  MRN: [id number]  Referring provider: Neelam Briggs DO  Dx:   Encounter Diagnosis     ICD-10-CM    1  Patellar dislocation, right, subsequent encounter S83 004D    2  Right knee injury, initial encounter S89  91XA Ambulatory referral to Orthopedic Surgery   3  Sprain of other ligament of right knee, subsequent encounter S83 8X1D    4  Contusion of bone T14  8XXA                   Subjective: Patient reports he has only performed his HEP one time since time of initial evaluation  Objective: See treatment diary below      Assessment: Initiated exercises this date to address impairments noted during initial evaluation  Pt was unable to tolerate lateral heel taps on right due to knee pain; however, was able to perform TKE with no reports of pain  Tolerated treatment well  Patient demonstrated fatigue post treatment, exhibited good technique with therapeutic exercises and would benefit from continued PT      Plan: Progress treatment as tolerated         Precautions: hx of 4 patellar dislocations      Manual  - (IE)            Not needed- full ROM                                                                     Exercise Diary   (IE) 12-17           NuStep 5 mins, L5 NV 5 mins, L5                        Standing:             - SLR hip abd with ext 2 x 10 ea RTB NV 2 x 10 ea GTB           - SLR hip ext 2 x 10 ea RTB NV 2 x 10 ea GTB           - SLR hip flexion 2 x 10 ea RTB NV 2 x 10 ea GTB           - front step up 10 x 6" step NV 10 x 6" step           - lateral step up and over 10 x 6" step NV 10 x 6" step           - lateral heel tap 10 x 4" step NV 10 x 4" step L only           - BIODEX squat 2 mins NV 2 mins, 67%           - TKE  15 x 5" 10#           - front lunges             - lateral lunges             - SLS             - lateral band walks             - retroband walk                          Table: - LAQ with TB (90-45 deg) 10 x 5" ea RTB NV 10 x 5" ea RTB           - HS curl with TB  10 x 5" ea RTB NV 10 x 5" ea RTB           - SL hip ER with TB 2 x 10 ea RTB NV 20 x 5" ea RTB           - bridges with TB around knees                                           Modalities  12-11 (IE)            Cryo as needed

## 2019-12-20 ENCOUNTER — APPOINTMENT (OUTPATIENT)
Dept: PHYSICAL THERAPY | Facility: CLINIC | Age: 24
End: 2019-12-20
Payer: COMMERCIAL

## 2019-12-23 ENCOUNTER — OFFICE VISIT (OUTPATIENT)
Dept: OBGYN CLINIC | Facility: CLINIC | Age: 24
End: 2019-12-23
Payer: COMMERCIAL

## 2019-12-23 VITALS
BODY MASS INDEX: 28.14 KG/M2 | SYSTOLIC BLOOD PRESSURE: 138 MMHG | WEIGHT: 190 LBS | DIASTOLIC BLOOD PRESSURE: 81 MMHG | HEIGHT: 69 IN | HEART RATE: 106 BPM

## 2019-12-23 DIAGNOSIS — M25.361 PATELLAR INSTABILITY OF RIGHT KNEE: ICD-10-CM

## 2019-12-23 DIAGNOSIS — S83.004D PATELLAR DISLOCATION, RIGHT, SUBSEQUENT ENCOUNTER: Primary | ICD-10-CM

## 2019-12-23 DIAGNOSIS — T14.8XXA CONTUSION OF BONE: ICD-10-CM

## 2019-12-23 DIAGNOSIS — S83.91XD SPRAIN OF RIGHT KNEE, UNSPECIFIED LIGAMENT, SUBSEQUENT ENCOUNTER: ICD-10-CM

## 2019-12-23 PROCEDURE — 99213 OFFICE O/P EST LOW 20 MIN: CPT | Performed by: FAMILY MEDICINE

## 2019-12-23 NOTE — PROGRESS NOTES
Assessment/Plan:  Assessment/Plan   Diagnoses and all orders for this visit:    Patellar dislocation, right, subsequent encounter    Patellar instability of right knee    Sprain of right knee, unspecified ligament, subsequent encounter    Contusion of bone         20-year-old active male status post right knee patellar dislocation from twisting injury on 11/02/2019  Discussed with patient physical exam, impression, and plan  Physical exam is unremarkable for bony or soft tissue tenderness of the knee  There is negative patellar apprehension and negative patellar inhibition and grind  He demonstrates normal range of motion, resisted strength testing, and duck walk without any pain  Clinical impression that he is recovered from his injury  He is advised to continue with course of formal physical therapy until formal discharge and to continue with home exercise program  He will follow up with me as needed  Subjective:   Patient ID: Kari Ewing is a 25 y o  male  Chief Complaint   Patient presents with    Right Knee - Follow-up       20-year-old male following up for right knee patellar dislocation from twisting injury on 11/02/2019  He was last seen 5 weeks ago at which point MRI reviewed with him was noted for thickening partial tear of the MPFL and contusions of lateral femoral condyle and medial aspect of the patella  He was prescribed naproxen 500 mg twice daily, provided patellar stabilizing knee brace, and referred to formal physical therapy  He reports improvement since his last visit  He has been doing physical therapy and home exercises as directed  He denies any pain with ambulating or going up and downstairs  He does report having pain described as localized to the anterior medial aspect knee, throbbing, intermittent, nonradiating, and brought on with cold/rainy weather  He has not had any new injury since his last visit  Knee Pain   This is a new problem   The current episode started more than 1 month ago  The problem has been rapidly improving  Associated symptoms include arthralgias  Pertinent negatives include no joint swelling, numbness or weakness  Exacerbated by: Cold  He has tried rest and NSAIDs (Physical therapy) for the symptoms  The treatment provided significant relief  Review of Systems   Musculoskeletal: Positive for arthralgias  Negative for joint swelling  Neurological: Negative for weakness and numbness  Objective:  Vitals:    12/23/19 1348   BP: 138/81   Pulse: (!) 106   Weight: 86 2 kg (190 lb)   Height: 5' 9" (1 753 m)     Right Knee Exam     Muscle Strength   The patient has normal right knee strength  Tenderness   The patient is experiencing no tenderness  Range of Motion   The patient has normal right knee ROM  Tests   Varus: negative Valgus: negative  Patellar apprehension: negative    Other   Swelling: none  Effusion: no effusion present    Comments:  Negative patellar inhibition and grind  No pain with duck walk      Right Hip Exam     Muscle Strength   Flexion: 5/5           Observations     Right Knee   Negative for effusion  Physical Exam   Constitutional: He is oriented to person, place, and time  He appears well-developed  No distress  HENT:   Head: Normocephalic and atraumatic  Eyes: Conjunctivae are normal    Neck: No tracheal deviation present  Cardiovascular:   Tachycardic   Pulmonary/Chest: Effort normal  No respiratory distress  Abdominal: He exhibits no distension  Musculoskeletal:        Right knee: He exhibits no effusion  Neurological: He is alert and oriented to person, place, and time  Skin: Skin is warm and dry  Psychiatric: He has a normal mood and affect  His behavior is normal    Nursing note and vitals reviewed

## 2019-12-23 NOTE — LETTER
December 23, 2019     Patient: Deion Padgett   YOB: 1995   Date of Visit: 12/23/2019       To Whom it May Concern:    Chelly Pineda is under my professional care  He was seen in my office on 12/23/2019  As of 12/23/2019 he may return to work full duty/without restrictions  If you have any questions or concerns, please don't hesitate to call           Sincerely,          Darrick Kelley DO        CC: No Recipients

## 2019-12-24 ENCOUNTER — APPOINTMENT (OUTPATIENT)
Dept: PHYSICAL THERAPY | Facility: CLINIC | Age: 24
End: 2019-12-24
Payer: COMMERCIAL

## 2019-12-27 ENCOUNTER — OFFICE VISIT (OUTPATIENT)
Dept: PHYSICAL THERAPY | Facility: CLINIC | Age: 24
End: 2019-12-27
Payer: COMMERCIAL

## 2019-12-27 DIAGNOSIS — S89.91XA RIGHT KNEE INJURY, INITIAL ENCOUNTER: Primary | ICD-10-CM

## 2019-12-27 DIAGNOSIS — S83.8X1D SPRAIN OF OTHER LIGAMENT OF RIGHT KNEE, SUBSEQUENT ENCOUNTER: ICD-10-CM

## 2019-12-27 DIAGNOSIS — M25.361 PATELLAR INSTABILITY OF RIGHT KNEE: ICD-10-CM

## 2019-12-27 DIAGNOSIS — S83.004D PATELLAR DISLOCATION, RIGHT, SUBSEQUENT ENCOUNTER: ICD-10-CM

## 2019-12-27 DIAGNOSIS — T14.8XXA CONTUSION OF BONE: ICD-10-CM

## 2019-12-27 PROCEDURE — 97110 THERAPEUTIC EXERCISES: CPT | Performed by: PHYSICAL THERAPIST

## 2019-12-27 PROCEDURE — 97112 NEUROMUSCULAR REEDUCATION: CPT | Performed by: PHYSICAL THERAPIST

## 2019-12-27 NOTE — PROGRESS NOTES
Daily Note     Today's date: 2019  Patient name: Bianca Kumar  : 1995  MRN: [id number]  Referring provider: Criss Romero DO  Dx:   Encounter Diagnosis     ICD-10-CM    1  Right knee injury, initial encounter S89  91XA    2  Patellar dislocation, right, subsequent encounter S83 004D    3  Sprain of other ligament of right knee, subsequent encounter S83 8X1D    4  Contusion of bone T14  8XXA    5  Patellar instability of right knee M25 361                   Subjective: Intermittent reports of ache in right knee  Objective: See treatment diary below      Assessment: Tolerated treatment well  Patient demonstrated fatigue post treatment  "I think I am going to be sore after today "  No pain during TE  Increased resistance per patient request       Plan: Continue per plan of care        Precautions: hx of 4 patellar dislocations      Manual  - (IE)            Not needed- full ROM                                                                     Exercise Diary  - (IE) -          NuStep 5 mins, L5 NV 5 mins, L5 L6x6'                       Standing:             - SLR hip abd with ext 2 x 10 ea RTB NV 2 x 10 ea GTB 2 x 10 ea BTB          - SLR hip ext 2 x 10 ea RTB NV 2 x 10 ea GTB 2 x 10 ea BTB          - SLR hip flexion 2 x 10 ea RTB NV 2 x 10 ea TB 2 x 10 ea BTB          - front step up 10 x 6" step NV 10 x 6" step 20 x 6" step          - lateral step up and over 10 x 6" step NV 10 x 6" step 20 x 6" step          - lateral heel tap 10 x 4" step NV 10 x 4" step L only 10 x 4" step sloan          - BIODEX squat 2 mins NV 2 mins, 67%           - TKE  15 x 5" 10# 15# 5" 2x15          - front lunges             - lateral lunges             - SLS             - lateral band walks   GTB 2 laps          - retroband [employer]   GTB 2 laps          [employer]   GTB 2 laps          Table:             - LAQ with TB (90-45 deg) 10 x 5" ea RTB NV 10 x 5" ea RTB np          - HS curl with TB  10 x 5" ea RTB NV 10 x 5" ea RTB np          - SL hip ER with TB 2 x 10 ea RTB NV 20 x 5" ea RTB 20 x 5" ea GTB          - bridges with TB around knees   3" GTB x20                                        Modalities  12-11 (IE)            Cryo as needed

## 2019-12-31 ENCOUNTER — APPOINTMENT (OUTPATIENT)
Dept: PHYSICAL THERAPY | Facility: CLINIC | Age: 24
End: 2019-12-31
Payer: COMMERCIAL

## 2019-12-31 NOTE — PROGRESS NOTES
Daily Note     Today's date: 2019  Patient name: Evelyn Rivera  : 1995  MRN: [id number]  Referring provider: Azam Perez DO  Dx: No diagnosis found  Subjective: ***      Objective: See treatment diary below      Assessment: Tolerated treatment {Tolerated treatment :[id number]}   Patient {assessment:9556363959}      Plan: {PLAN:3220913578}     Precautions: hx of 4 patellar dislocations      Manual   (IE)            Not needed- full ROM                                                                     Exercise Diary   (IE)           NuStep 5 mins, L5 NV 5 mins, L5 L6x6'                       Standing:             - SLR hip abd with ext 2 x 10 ea RTB NV 2 x 10 ea GTB 2 x 10 ea BTB          - SLR hip ext 2 x 10 ea RTB NV 2 x 10 ea GTB 2 x 10 ea BTB          - SLR hip flexion 2 x 10 ea RTB NV 2 x 10 ea TB 2 x 10 ea BTB          - front step up 10 x 6" step NV 10 x 6" step 20 x 6" step          - lateral step up and over 10 x 6" step NV 10 x 6" step 20 x 6" step          - lateral heel tap 10 x 4" step NV 10 x 4" step L only 10 x 4" step sloan          - BIODEX squat 2 mins NV 2 mins, 67%           - TKE  15 x 5" 10# 15# 5" 2x15          - front lunges             - lateral lunges             - SLS             - lateral band walks   GTB 2 laps          - retroband [employer]   GTB 2 laps          [employer]   GTB 2 laps          Table:             - LAQ with TB (90-45 deg) 10 x 5" ea RTB NV 10 x 5" ea RTB np          - HS curl with TB  10 x 5" ea RTB NV 10 x 5" ea RTB np          - SL hip ER with TB 2 x 10 ea RTB NV 20 x 5" ea RTB 20 x 5" ea GTB          - bridges with TB around knees   3" GTB x20                                        Modalities  - (IE)            Cryo as needed

## 2020-01-02 ENCOUNTER — TELEPHONE (OUTPATIENT)
Dept: OBGYN CLINIC | Facility: HOSPITAL | Age: 25
End: 2020-01-02

## 2020-01-02 ENCOUNTER — APPOINTMENT (OUTPATIENT)
Dept: PHYSICAL THERAPY | Facility: CLINIC | Age: 25
End: 2020-01-02
Payer: COMMERCIAL

## 2020-01-02 NOTE — TELEPHONE ENCOUNTER
Patient called in  # [phone]    Per patient's request  Faxed work note to employer    [facility] [employer] [facility] [employer]  # [phone]  Fax# [phone]

## 2020-01-03 ENCOUNTER — APPOINTMENT (OUTPATIENT)
Dept: PHYSICAL THERAPY | Facility: CLINIC | Age: 25
End: 2020-01-03
Payer: COMMERCIAL

## 2020-01-07 ENCOUNTER — TELEPHONE (OUTPATIENT)
Dept: PHYSICAL THERAPY | Facility: CLINIC | Age: 25
End: 2020-01-07

## 2020-01-07 ENCOUNTER — APPOINTMENT (OUTPATIENT)
Dept: PHYSICAL THERAPY | Facility: CLINIC | Age: 25
End: 2020-01-07
Payer: COMMERCIAL

## 2020-01-07 NOTE — TELEPHONE ENCOUNTER
Called patient about missed appointment  Pt asked to please give a call to confirm next appointment as a RE with Yeimy Condon

## 2020-01-08 ENCOUNTER — OFFICE VISIT (OUTPATIENT)
Dept: PHYSICAL THERAPY | Facility: CLINIC | Age: 25
End: 2020-01-08
Payer: COMMERCIAL

## 2020-01-08 DIAGNOSIS — S83.004D PATELLAR DISLOCATION, RIGHT, SUBSEQUENT ENCOUNTER: Primary | ICD-10-CM

## 2020-01-08 DIAGNOSIS — S83.8X1D SPRAIN OF OTHER LIGAMENT OF RIGHT KNEE, SUBSEQUENT ENCOUNTER: ICD-10-CM

## 2020-01-08 DIAGNOSIS — T14.8XXA CONTUSION OF BONE: ICD-10-CM

## 2020-01-08 DIAGNOSIS — M25.361 PATELLAR INSTABILITY OF RIGHT KNEE: ICD-10-CM

## 2020-01-08 PROCEDURE — 97112 NEUROMUSCULAR REEDUCATION: CPT | Performed by: PHYSICAL THERAPIST

## 2020-01-08 PROCEDURE — 97110 THERAPEUTIC EXERCISES: CPT | Performed by: PHYSICAL THERAPIST

## 2020-01-08 PROCEDURE — 97530 THERAPEUTIC ACTIVITIES: CPT | Performed by: PHYSICAL THERAPIST

## 2020-01-08 NOTE — PROGRESS NOTES
Daily Note     Today's date: 2020  Patient name: Linwood Fisher  : 1995  MRN: [id number]  Referring provider: Emanuel Bamberger, DO  Dx:   Encounter Diagnosis     ICD-10-CM    1  Patellar dislocation, right, subsequent encounter S83 004D    2  Sprain of other ligament of right knee, subsequent encounter S83 8X1D    3  Contusion of bone T14  8XXA    4  Patellar instability of right knee M25 361        Start Time: 1635  Stop Time: 1715  Total time in clinic (min): 40 minutes    Subjective: Patient reports that his right knee feels achy d/t the weather  Objective: See treatment diary below      Assessment: Tolerated treatment well  Patient demonstrated fatigue post treatment, exhibited good technique with therapeutic exercises and would benefit from continued PT  Patient demonstrated improved eccentric control descending 4" step with no c/o instability or pain  Plan: Continue per plan of care  Progress treatment as tolerated  Precautions: hx of 4 patellar dislocations      Manual  - (IE)            Not needed- full ROM                                                                     Exercise Diary  - (IE) -         NuStep 5 mins, L5 NV 5 mins, L5 L6x6' L6 x 6'                      Standing:             - SLR hip abd with ext 2 x 10 ea RTB NV 2 x 10 ea GTB 2 x 10 ea BTB 2x10 ea  BTB         - SLR hip ext 2 x 10 ea RTB NV 2 x 10 ea GTB 2 x 10 ea BTB 2x10 ea  BTB         - SLR hip flexion 2 x 10 ea RTB NV 2 x 10 ea TB 2 x 10 ea BTB 2x10 ea   BTB         - front step up 10 x 6" step NV 10 x 6" step 20 x 6" step 20x 6" step         - lateral step up and over 10 x 6" step NV 10 x 6" step 20 x 6" step 20x 6" step         - lateral heel tap 10 x 4" step NV 10 x 4" step L only 10 x 4" step sloan 2x10 4" step b/l         - BIODEX squat 2 mins NV 2 mins, 67%           - TKE  15 x 5" 10# 15# 5" 2x15 20# 5" 2x10         - front lunges             - lateral lunges             - SLS - lateral band walks   GTB 2 laps GTN 2 laps         - retroband [address]   GTB 2 laps GTB 2 laps         [employer]   GTB 2 laps GTB 2 laps         Table:             - LAQ with TB (90-45 deg) 10 x 5" ea RTB NV 10 x 5" ea RTB np          - HS curl with TB  10 x 5" ea RTB NV 10 x 5" ea RTB np          - SL hip ER with TB 2 x 10 ea RTB NV 20 x 5" ea RTB 20 x 5" ea GTB 20x5" ea   GTB         - bridges with TB around knees   3" GTB x20 3" GTB 20x                                       Modalities  12-11 (IE)            Cryo as needed

## 2020-01-10 ENCOUNTER — APPOINTMENT (OUTPATIENT)
Dept: PHYSICAL THERAPY | Facility: CLINIC | Age: 25
End: 2020-01-10
Payer: COMMERCIAL

## 2020-01-10 NOTE — PROGRESS NOTES
Addendum 1-10-20: Pt discharged this date due to non-compliance with POC  Pt has cancelled 5 visits and no showed to 1 visit in the past 30 days  Educated pt to continue with his HEP and when things in his life settle down and he can be compliant with attending sessions consistently

## 2020-01-14 ENCOUNTER — APPOINTMENT (OUTPATIENT)
Dept: PHYSICAL THERAPY | Facility: CLINIC | Age: 25
End: 2020-01-14
Payer: COMMERCIAL

## 2020-01-17 ENCOUNTER — APPOINTMENT (OUTPATIENT)
Dept: PHYSICAL THERAPY | Facility: CLINIC | Age: 25
End: 2020-01-17
Payer: COMMERCIAL

## 2020-06-27 ENCOUNTER — OFFICE VISIT (OUTPATIENT)
Dept: URGENT CARE | Facility: CLINIC | Age: 25
End: 2020-06-27
Payer: COMMERCIAL

## 2020-06-27 VITALS
OXYGEN SATURATION: 100 % | BODY MASS INDEX: 28.11 KG/M2 | HEIGHT: 69 IN | RESPIRATION RATE: 18 BRPM | DIASTOLIC BLOOD PRESSURE: 73 MMHG | WEIGHT: 189.8 LBS | SYSTOLIC BLOOD PRESSURE: 131 MMHG | TEMPERATURE: 97.5 F | HEART RATE: 98 BPM

## 2020-06-27 DIAGNOSIS — L70.0 CYSTIC ACNE: ICD-10-CM

## 2020-06-27 DIAGNOSIS — K64.9 HEMORRHOIDS, UNSPECIFIED HEMORRHOID TYPE: Primary | ICD-10-CM

## 2020-06-27 PROCEDURE — 99214 OFFICE O/P EST MOD 30 MIN: CPT

## 2020-06-27 RX ORDER — POLYETHYLENE GLYCOL 3350 17 G/17G
17 POWDER, FOR SOLUTION ORAL DAILY
Qty: 255 G | Refills: 0 | Status: SHIPPED | OUTPATIENT
Start: 2020-06-27 | End: 2020-06-28 | Stop reason: SDUPTHER

## 2020-06-27 RX ORDER — ERYTHROMYCIN AND BENZOYL PEROXIDE 30; 50 MG/G; MG/G
GEL TOPICAL 2 TIMES DAILY
Qty: 23.3 G | Refills: 0 | Status: SHIPPED | OUTPATIENT
Start: 2020-06-27 | End: 2020-06-28 | Stop reason: SDUPTHER

## 2020-06-27 RX ORDER — HYDROCORTISONE 25 MG/G
CREAM TOPICAL 2 TIMES DAILY
Qty: 28 G | Refills: 0 | Status: SHIPPED | OUTPATIENT
Start: 2020-06-27 | End: 2020-06-28 | Stop reason: SDUPTHER

## 2020-06-28 RX ORDER — POLYETHYLENE GLYCOL 3350 17 G/17G
17 POWDER, FOR SOLUTION ORAL DAILY
Qty: 255 G | Refills: 0 | Status: SHIPPED | OUTPATIENT
Start: 2020-06-28

## 2020-06-28 RX ORDER — ERYTHROMYCIN AND BENZOYL PEROXIDE 30; 50 MG/G; MG/G
GEL TOPICAL 2 TIMES DAILY
Qty: 23.3 G | Refills: 0 | Status: SHIPPED | OUTPATIENT
Start: 2020-06-28

## 2020-06-28 RX ORDER — HYDROCORTISONE 25 MG/G
CREAM TOPICAL 2 TIMES DAILY
Qty: 28 G | Refills: 0 | Status: SHIPPED | OUTPATIENT
Start: 2020-06-28

## 2021-03-17 ENCOUNTER — NURSE TRIAGE (OUTPATIENT)
Dept: OTHER | Facility: OTHER | Age: 26
End: 2021-03-17

## 2021-03-17 DIAGNOSIS — Z20.822 EXPOSURE TO COVID-19 VIRUS: Primary | ICD-10-CM

## 2021-03-17 DIAGNOSIS — Z20.822 EXPOSURE TO COVID-19 VIRUS: ICD-10-CM

## 2021-03-17 PROCEDURE — U0003 INFECTIOUS AGENT DETECTION BY NUCLEIC ACID (DNA OR RNA); SEVERE ACUTE RESPIRATORY SYNDROME CORONAVIRUS 2 (SARS-COV-2) (CORONAVIRUS DISEASE [COVID-19]), AMPLIFIED PROBE TECHNIQUE, MAKING USE OF HIGH THROUGHPUT TECHNOLOGIES AS DESCRIBED BY CMS-2020-01-R: HCPCS | Performed by: FAMILY MEDICINE

## 2021-03-17 PROCEDURE — U0005 INFEC AGEN DETEC AMPLI PROBE: HCPCS | Performed by: FAMILY MEDICINE

## 2021-03-17 NOTE — TELEPHONE ENCOUNTER
Regarding: COVID EXPOSURE  ----- Message from [employer] Agent sent at 3/17/2021  4:03 PM EDT -----  " I was exposed by my sister who tested positive"

## 2021-03-17 NOTE — TELEPHONE ENCOUNTER
Reason for Disposition   [1] COVID-19 infection suspected by caller or triager AND [2] mild symptoms (cough, fever, or others) AND [5] no complications or SOB    Answer Assessment - Initial Assessment Questions  Were you within 6 feet or less, for up to 15 minutes or more with a person that has a confirmed COVID-19 test?        Yes    What was the date of your exposure? 3/14/21    Are you experiencing any symptoms attributed to the virus?  (Assess for SOB, cough, fever, difficulty breathing)        Yes    Cough, runny nose, sore throat    HIGH RISK: Do you have any history heart or lung conditions, weakened immune system, diabetes, Asthma, CHF, HIV, COPD, Chemo, renal failure, sickle cell, etc?        Denies    Protocols used: CORONAVIRUS (COVID-19) DIAGNOSED OR SUSPECTED-ADULT-[place]

## 2021-03-17 NOTE — TELEPHONE ENCOUNTER
Pt is requesting a covid test and does not have a [facility] PCP  He reports that he had a [facility] PCP in the past and will establish care again with that same doctor in the future if need be  Encouraged him to contact the office regarding testing for COVID and his symptoms  He was unsure if he was going to at this time because he feels his symptoms are very mild and this is unnecessary  Reviewed reasons to contact them and he verbalized understanding  Order placed  Pt informed of closest testing site and was advised of hours of operation, address, to wear a mask, and to stay in the car  Link sent to pts email address to create a [facility] account to check for results

## 2021-03-18 LAB — SARS-COV-2 RNA RESP QL NAA+PROBE: NEGATIVE

## 2021-08-22 ENCOUNTER — APPOINTMENT (EMERGENCY)
Dept: ULTRASOUND IMAGING | Facility: HOSPITAL | Age: 26
End: 2021-08-22
Payer: COMMERCIAL

## 2021-08-22 ENCOUNTER — APPOINTMENT (EMERGENCY)
Dept: CT IMAGING | Facility: HOSPITAL | Age: 26
End: 2021-08-22
Payer: COMMERCIAL

## 2021-08-22 ENCOUNTER — HOSPITAL ENCOUNTER (EMERGENCY)
Facility: HOSPITAL | Age: 26
Discharge: HOME | End: 2021-08-22
Attending: EMERGENCY MEDICINE
Payer: COMMERCIAL

## 2021-08-22 VITALS
OXYGEN SATURATION: 98 % | DIASTOLIC BLOOD PRESSURE: 66 MMHG | TEMPERATURE: 98.4 F | RESPIRATION RATE: 20 BRPM | WEIGHT: 180 LBS | SYSTOLIC BLOOD PRESSURE: 121 MMHG | HEART RATE: 68 BPM | BODY MASS INDEX: 26.66 KG/M2 | HEIGHT: 69 IN

## 2021-08-22 DIAGNOSIS — N50.811 RIGHT TESTICULAR PAIN: Primary | ICD-10-CM

## 2021-08-22 DIAGNOSIS — R10.31 RIGHT LOWER QUADRANT ABDOMINAL PAIN: ICD-10-CM

## 2021-08-22 LAB
ALBUMIN SERPL BCP-MCNC: 3.7 G/DL (ref 3.5–5)
ALP SERPL-CCNC: 56 U/L (ref 46–116)
ALT SERPL W P-5'-P-CCNC: 36 U/L (ref 12–78)
ANION GAP SERPL CALCULATED.3IONS-SCNC: 8 MMOL/L (ref 4–13)
AST SERPL W P-5'-P-CCNC: 16 U/L (ref 5–45)
BACTERIA UR QL AUTO: ABNORMAL /HPF
BASOPHILS # BLD AUTO: 0.03 THOUSANDS/ΜL (ref 0–0.1)
BASOPHILS NFR BLD AUTO: 0 % (ref 0–1)
BILIRUB SERPL-MCNC: 0.33 MG/DL (ref 0.2–1)
BILIRUB UR QL STRIP: NEGATIVE
BUN SERPL-MCNC: 18 MG/DL (ref 5–25)
CALCIUM SERPL-MCNC: 8.5 MG/DL (ref 8.3–10.1)
CHLORIDE SERPL-SCNC: 105 MMOL/L (ref 100–108)
CLARITY UR: ABNORMAL
CO2 SERPL-SCNC: 27 MMOL/L (ref 21–32)
COLOR UR: YELLOW
CREAT SERPL-MCNC: 1.02 MG/DL (ref 0.6–1.3)
EOSINOPHIL # BLD AUTO: 0.08 THOUSAND/ΜL (ref 0–0.61)
EOSINOPHIL NFR BLD AUTO: 1 % (ref 0–6)
ERYTHROCYTE [DISTWIDTH] IN BLOOD BY AUTOMATED COUNT: 11.9 % (ref 11.6–15.1)
GFR SERPL CREATININE-BSD FRML MDRD: 102 ML/MIN/1.73SQ M
GLUCOSE SERPL-MCNC: 131 MG/DL (ref 65–140)
GLUCOSE UR STRIP-MCNC: NEGATIVE MG/DL
HCT VFR BLD AUTO: 43.6 % (ref 36.5–49.3)
HGB BLD-MCNC: 14.8 G/DL (ref 12–17)
HGB UR QL STRIP.AUTO: ABNORMAL
IMM GRANULOCYTES # BLD AUTO: 0.05 THOUSAND/UL (ref 0–0.2)
IMM GRANULOCYTES NFR BLD AUTO: 0 % (ref 0–2)
KETONES UR STRIP-MCNC: NEGATIVE MG/DL
LEUKOCYTE ESTERASE UR QL STRIP: NEGATIVE
LYMPHOCYTES # BLD AUTO: 1.67 THOUSANDS/ΜL (ref 0.6–4.47)
LYMPHOCYTES NFR BLD AUTO: 13 % (ref 14–44)
MCH RBC QN AUTO: 30.6 PG (ref 26.8–34.3)
MCHC RBC AUTO-ENTMCNC: 33.9 G/DL (ref 31.4–37.4)
MCV RBC AUTO: 90 FL (ref 82–98)
MONOCYTES # BLD AUTO: 0.64 THOUSAND/ΜL (ref 0.17–1.22)
MONOCYTES NFR BLD AUTO: 5 % (ref 4–12)
NEUTROPHILS # BLD AUTO: 10.07 THOUSANDS/ΜL (ref 1.85–7.62)
NEUTS SEG NFR BLD AUTO: 81 % (ref 43–75)
NITRITE UR QL STRIP: NEGATIVE
NON-SQ EPI CELLS URNS QL MICRO: ABNORMAL /HPF
NRBC BLD AUTO-RTO: 0 /100 WBCS
PH UR STRIP.AUTO: 6 [PH]
PLATELET # BLD AUTO: 228 THOUSANDS/UL (ref 149–390)
PMV BLD AUTO: 9.4 FL (ref 8.9–12.7)
POTASSIUM SERPL-SCNC: 3.4 MMOL/L (ref 3.5–5.3)
PROT SERPL-MCNC: 7 G/DL (ref 6.4–8.2)
PROT UR STRIP-MCNC: NEGATIVE MG/DL
RBC # BLD AUTO: 4.83 MILLION/UL (ref 3.88–5.62)
RBC #/AREA URNS AUTO: ABNORMAL /HPF
SODIUM SERPL-SCNC: 140 MMOL/L (ref 136–145)
SP GR UR STRIP.AUTO: <=1.005 (ref 1–1.03)
UROBILINOGEN UR QL STRIP.AUTO: 0.2 E.U./DL
WBC # BLD AUTO: 12.54 THOUSAND/UL (ref 4.31–10.16)
WBC #/AREA URNS AUTO: ABNORMAL /HPF

## 2021-08-22 PROCEDURE — 96374 THER/PROPH/DIAG INJ IV PUSH: CPT

## 2021-08-22 PROCEDURE — 36415 COLL VENOUS BLD VENIPUNCTURE: CPT | Performed by: PHYSICIAN ASSISTANT

## 2021-08-22 PROCEDURE — 81001 URINALYSIS AUTO W/SCOPE: CPT | Performed by: PHYSICIAN ASSISTANT

## 2021-08-22 PROCEDURE — 87491 CHLMYD TRACH DNA AMP PROBE: CPT | Performed by: PHYSICIAN ASSISTANT

## 2021-08-22 PROCEDURE — 85025 COMPLETE CBC W/AUTO DIFF WBC: CPT | Performed by: PHYSICIAN ASSISTANT

## 2021-08-22 PROCEDURE — 80053 COMPREHEN METABOLIC PANEL: CPT | Performed by: PHYSICIAN ASSISTANT

## 2021-08-22 PROCEDURE — 99284 EMERGENCY DEPT VISIT MOD MDM: CPT

## 2021-08-22 PROCEDURE — 74177 CT ABD & PELVIS W/CONTRAST: CPT

## 2021-08-22 PROCEDURE — 87591 N.GONORRHOEAE DNA AMP PROB: CPT | Performed by: PHYSICIAN ASSISTANT

## 2021-08-22 PROCEDURE — G1004 CDSM NDSC: HCPCS

## 2021-08-22 PROCEDURE — 76870 US EXAM SCROTUM: CPT

## 2021-08-22 PROCEDURE — 96375 TX/PRO/DX INJ NEW DRUG ADDON: CPT

## 2021-08-22 PROCEDURE — 99285 EMERGENCY DEPT VISIT HI MDM: CPT | Performed by: PHYSICIAN ASSISTANT

## 2021-08-22 PROCEDURE — 96361 HYDRATE IV INFUSION ADD-ON: CPT

## 2021-08-22 RX ORDER — ONDANSETRON 2 MG/ML
4 INJECTION INTRAMUSCULAR; INTRAVENOUS ONCE
Status: COMPLETED | OUTPATIENT
Start: 2021-08-22 | End: 2021-08-22

## 2021-08-22 RX ORDER — MORPHINE SULFATE 4 MG/ML
4 INJECTION, SOLUTION INTRAMUSCULAR; INTRAVENOUS ONCE
Status: COMPLETED | OUTPATIENT
Start: 2021-08-22 | End: 2021-08-22

## 2021-08-22 RX ADMIN — MORPHINE SULFATE 4 MG: 4 INJECTION INTRAVENOUS at 01:22

## 2021-08-22 RX ADMIN — SODIUM CHLORIDE 1000 ML: 0.9 INJECTION, SOLUTION INTRAVENOUS at 01:22

## 2021-08-22 RX ADMIN — IOHEXOL 99 ML: 350 INJECTION, SOLUTION INTRAVENOUS at 01:59

## 2021-08-22 RX ADMIN — ONDANSETRON 4 MG: 2 INJECTION INTRAMUSCULAR; INTRAVENOUS at 01:23

## 2021-08-22 NOTE — ED NOTES
Patient transported to Ultrasound     Marlee Lopez, [address] [facility]  08/22/21 [id number]

## 2021-08-22 NOTE — ED PROVIDER NOTES
History  Chief Complaint   Patient presents with    Abdominal Pain     Pt c/o LRQ ABD pain that started approx 1 hour ago  Denies N/V/D but is "gassy"    Testicle Pain     Pt c/o right testicle pain x 1 hour  Denies injury  Patient is a 80-year-old male with no significant past medical history who presents to the emergency department for evaluation of right testicular pain that started about 1 hour prior to arrival   Patient states that he was lying on his couch when the pain started  Patient states that the pain is an 8/10 in severity  Patient also reports that there is pain in the right lower quadrant of his abdomen  He is having associated nausea  Patient is not having any vomiting or diarrhea  Patient is not having any dysuria or hematuria  Patient denies all other symptoms at this time  Prior to Admission Medications   Prescriptions Last Dose Informant Patient Reported? Taking?   benzoyl peroxide-erythromycin (BENZAMYCIN) gel   No No   Sig: Apply topically 2 (two) times a day   hydrocortisone (ANUSOL-HC) 2 5 % rectal cream   No No   Sig: Apply topically 2 (two) times a day   naproxen (NAPROSYN) 500 mg tablet  Self No No   Sig: Take 1 tablet (500 mg total) by mouth 2 (two) times a day with meals   polyethylene glycol (GLYCOLAX) 17 GM/SCOOP powder   No No   Sig: Take 17 g by mouth daily      Facility-Administered Medications: None       History reviewed  No pertinent past medical history  History reviewed  No pertinent surgical history  Family History   Problem Relation Age of Onset    No Known Problems Mother     Heart disease Father      I have reviewed and agree with the history as documented      E-Cigarette/Vaping    E-Cigarette Use Never User      E-Cigarette/Vaping Substances    Nicotine No     THC No     CBD No     Flavoring No     Other No     Unknown No      Social History     Tobacco Use    Smoking status: Current Every Day Smoker     Packs/day: 0 50     Years: 5 00 Pack years: 2 50     Types: Cigarettes    Smokeless tobacco: Never Used   Vaping Use    Vaping Use: Never used   Substance Use Topics    Alcohol use: Yes     Alcohol/week: 6 0 standard drinks     Types: 6 Standard drinks or equivalent per week    Drug use: Yes     Types: Marijuana       Review of Systems   Constitutional: Negative for chills, diaphoresis and fever  HENT: Negative for congestion, drooling, facial swelling, nosebleeds, sore throat and voice change  Eyes: Negative for discharge and redness  Respiratory: Negative for cough, choking, chest tightness, shortness of breath and stridor  Cardiovascular: Negative for chest pain and palpitations  Gastrointestinal: Positive for abdominal pain and nausea  Negative for diarrhea and vomiting  Genitourinary: Positive for testicular pain (Right-sided)  Negative for decreased urine volume, difficulty urinating, dysuria, flank pain, frequency and urgency  Musculoskeletal: Negative for arthralgias, back pain, neck pain and neck stiffness  Skin: Negative for color change, rash and wound  Neurological: Negative for dizziness, syncope, facial asymmetry, weakness, light-headedness, numbness and headaches  Psychiatric/Behavioral: Negative for confusion and suicidal ideas  The patient is not nervous/anxious  All other systems reviewed and are negative  Physical Exam  Physical Exam  Vitals reviewed  Constitutional:       General: He is not in acute distress  Appearance: Normal appearance  He is normal weight  He is not ill-appearing, toxic-appearing or diaphoretic  HENT:      Head: Normocephalic and atraumatic  Right Ear: External ear normal       Left Ear: External ear normal       Mouth/Throat:      Mouth: Mucous membranes are moist       Pharynx: Oropharynx is clear  No oropharyngeal exudate or posterior oropharyngeal erythema  Eyes:      General: No scleral icterus  Right eye: No discharge           Left eye: No discharge  Extraocular Movements: Extraocular movements intact  Conjunctiva/sclera: Conjunctivae normal       Pupils: Pupils are equal, round, and reactive to light  Cardiovascular:      Rate and Rhythm: Normal rate and regular rhythm  Pulses: Normal pulses  Heart sounds: Normal heart sounds  No murmur heard  No friction rub  No gallop  Pulmonary:      Effort: Pulmonary effort is normal  No respiratory distress  Breath sounds: Normal breath sounds  No stridor  No wheezing, rhonchi or rales  Abdominal:      General: Abdomen is flat  Palpations: Abdomen is soft  Tenderness: There is abdominal tenderness in the right lower quadrant  There is no right CVA tenderness, left CVA tenderness, guarding or rebound  Genitourinary:     Testes: Normal  Cremasteric reflex is present  Right: Mass, tenderness (No testicular tenderness despite subjective complaint of right testicle pain) or swelling not present  Left: Mass, tenderness or swelling not present  Musculoskeletal:         General: Normal range of motion  Cervical back: Normal range of motion and neck supple  Right lower leg: No edema  Left lower leg: No edema  Skin:     General: Skin is warm and dry  Capillary Refill: Capillary refill takes less than 2 seconds  Neurological:      General: No focal deficit present  Mental Status: He is alert and oriented to person, place, and time     Psychiatric:         Mood and Affect: Mood normal          Behavior: Behavior normal          Vital Signs  ED Triage Vitals   Temperature Pulse Respirations Blood Pressure SpO2   08/22/21 0010 08/22/21 0010 08/22/21 0010 08/22/21 0010 08/22/21 0010   98 4 °F (36 9 °C) 75 19 160/90 100 %      Temp src Heart Rate Source Patient Position - Orthostatic VS BP Location FiO2 (%)   -- 08/22/21 0010 08/22/21 0010 08/22/21 0010 --    Monitor Sitting Left arm       Pain Score       08/22/21 0122       8 Vitals:    08/22/21 0010 08/22/21 0130 08/22/21 0300   BP: 160/90 123/73 121/66   Pulse: 75 62 68   Patient Position - Orthostatic VS: Sitting Lying Lying         Visual Acuity      ED Medications  Medications   sodium chloride 0 9 % bolus 1,000 mL (0 mL Intravenous Stopped 8/22/21 0222)   morphine (PF) 4 mg/mL injection 4 mg (4 mg Intravenous Given 8/22/21 0122)   ondansetron (ZOFRAN) injection 4 mg (4 mg Intravenous Given 8/22/21 0123)   iohexol (OMNIPAQUE) 350 MG/ML injection (SINGLE-DOSE) 100 mL (99 mL Intravenous Given 8/22/21 0159)       Diagnostic Studies  Results Reviewed     Procedure Component Value Units Date/Time    Chlamydia/GC amplified DNA by PCR [[id number]]  (Normal) Collected: 08/22/21 0312    Lab Status: Final result Specimen: Urine, Other Updated: 08/23/21 2203     N gonorrhoeae, DNA Probe Negative     Chlamydia trachomatis, DNA Probe Negative    Narrative:      Test performed using PCR amplification of target DNA  This test is intended as an aid in the diagnosis of Chlamydial and gonococcal disease  This test has not been evaluated in patients younger than 15years of age and is not recommended for evaluation of suspected sexual abuse  Additional testing is recommended when the results do not correlate with clinical signs and symptoms        Urine Microscopic [[id number]]  (Abnormal) Collected: 08/22/21 0313    Lab Status: Final result Specimen: Urine, Clean Catch Updated: 08/22/21 0343     RBC, UA 10-20 /hpf      WBC, UA None Seen /hpf      Epithelial Cells None Seen /hpf      Bacteria, UA None Seen /hpf     UA w Reflex to Microscopic w Reflex to Culture [[id number]]  (Abnormal) Collected: 08/22/21 0313    Lab Status: Final result Specimen: Urine, Clean Catch Updated: 08/22/21 0331     Color, UA Yellow     Clarity, UA Slightly Cloudy     Specific Gravity, UA <=1 005     pH, UA 6 0     Leukocytes, UA Negative     Nitrite, UA Negative     Protein, UA Negative mg/dl      Glucose, UA Negative mg/dl      Ketones, UA Negative mg/dl      Urobilinogen, UA 0 2 E U /dl      Bilirubin, UA Negative     Blood, UA Moderate    Comprehensive metabolic panel [[id number]]  (Abnormal) Collected: 08/22/21 0121    Lab Status: Final result Specimen: Blood from Arm, Right Updated: 08/22/21 0150     Sodium 140 mmol/L      Potassium 3 4 mmol/L      Chloride 105 mmol/L      CO2 27 mmol/L      ANION GAP 8 mmol/L      BUN 18 mg/dL      Creatinine 1 02 mg/dL      Glucose 131 mg/dL      Calcium 8 5 mg/dL      AST 16 U/L      ALT 36 U/L      Alkaline Phosphatase 56 U/L      Total Protein 7 0 g/dL      Albumin 3 7 g/dL      Total Bilirubin 0 33 mg/dL      eGFR 102 ml/min/1 73sq m     Narrative:      [place] guidelines for Chronic Kidney Disease (CKD):     Stage 1 with normal or high GFR (GFR > 90 mL/min/1 73 square meters)    Stage 2 Mild CKD (GFR = 60-89 mL/min/1 73 square meters)    Stage 3A Moderate CKD (GFR = 45-59 mL/min/1 73 square meters)    Stage 3B Moderate CKD (GFR = 30-44 mL/min/1 73 square meters)    Stage 4 Severe CKD (GFR = 15-29 mL/min/1 73 square meters)    Stage 5 End Stage CKD (GFR <15 mL/min/1 73 square meters)  Note: GFR calculation is accurate only with a steady state creatinine    CBC and differential [[id number]]  (Abnormal) Collected: 08/22/21 0121    Lab Status: Final result Specimen: Blood from Arm, Right Updated: 08/22/21 0132     WBC 12 54 Thousand/uL      RBC 4 83 Million/uL      Hemoglobin 14 8 g/dL      Hematocrit 43 6 %      MCV 90 fL      MCH 30 6 pg      MCHC 33 9 g/dL      RDW 11 9 %      MPV 9 4 fL      Platelets 885 Thousands/uL      nRBC 0 /100 WBCs      Neutrophils Relative 81 %      Immat GRANS % 0 %      Lymphocytes Relative 13 %      Monocytes Relative 5 %      Eosinophils Relative 1 %      Basophils Relative 0 %      Neutrophils Absolute 10 07 Thousands/µL      Immature Grans Absolute 0 05 Thousand/uL      Lymphocytes Absolute 1 67 Thousands/µL      Monocytes Absolute 0 64 Thousand/µL      Eosinophils Absolute 0 08 Thousand/µL      Basophils Absolute 0 03 Thousands/µL                  CT abdomen pelvis with contrast   Final Result by Joanie Rabago MD (08/22 9613)      No acute inflammatory process identified within the abdomen or pelvis  Workstation performed: [id number]         US scrotum and testicles   Final Result by Yohannes Alonso MD (08/22 4253)       1  No evidence of testicular torsion  2   2 0 x 0 5 x 1 6 cm right inguinal lymph node in the area of reported pain, nonspecific  Workstation performed: [id number]                    Procedures  Procedures         ED Course  ED Course as of Aug 26 0042   Rody Loli Aug 22, 2021   8271 Patient currently feeling significantly improved  CT abdomen and pelvis without any acute abnormalities  Testicular ultrasound normal   Patient will be discharged home  MDM  Number of Diagnoses or Management Options  Right lower quadrant abdominal pain  Right testicular pain  Diagnosis management comments: Patient is a 15-year-old male with no significant past medical history who presents to the emergency department for evaluation of right testicular pain that started about 1 hour prior to arrival   Patient states that he was lying on his couch when the pain started  Patient states that the pain is an 8/10 in severity  Patient also reports that there is pain in the right lower quadrant of his abdomen  He is having associated nausea  Patient is not having any vomiting or diarrhea  Patient is not having any dysuria or hematuria  Patient denies all other symptoms at this time  Patient appears uncomfortable in bed secondary to pain  His vital signs are normal   He is not any acute distress  Physical exam remarkable for right lower quadrant abdominal tenderness without rebound or guarding  No CVA tenderness    There is no testicular tenderness despite subjective complaint of testicular pain  Testicles appear normal   There is no swelling  Patient labs unremarkable  Urinalysis not concerning for any acute urinary tract infection  Gonorrhea chlamydia was sent to lab  Scrotal ultrasound without any abnormalities  CT of the abdomen and pelvis without acute intra-abdominal pathology  Patient currently feeling much better  Patient states that his symptoms have significantly improved  Patient discharged home with instructions to follow-up with urology  He was given very strict instructions on when to return to the emergency department  Patient stable at this time  Amount and/or Complexity of Data Reviewed  Clinical lab tests: ordered and reviewed  Tests in the radiology section of CPT®: ordered and reviewed    Patient Progress  Patient progress: stable      Disposition  Final diagnoses:   Right testicular pain   Right lower quadrant abdominal pain     Time reflects when diagnosis was documented in both MDM as applicable and the Disposition within this note     Time User Action Codes Description Comment    8/22/2021  3:48 AM Lenord Pat Add [N50 811] Right testicular pain     8/22/2021  3:48 AM Lenord Pat Add [R10 31] Right lower quadrant abdominal pain       ED Disposition     ED Disposition Condition Date/Time Comment    Discharge Stable Sun Aug 22, 2021  3:48 AM Wilmer Tirado discharge to home/self care              Follow-up Information     Follow up With Specialties Details Why Contact Info Additional [address] [facility] Emergency Department Emergency Medicine Go to  If symptoms worsen 34 [facility] 109 [facility] Emergency Department, [address] [place] [address], [address] For Urology [facility] Urology Schedule an appointment as soon as possible for a visit  For follow-up [address]  [address] [facility] [id number]  1  [facility] For Urology [address] [facility], [address] [place] [address], [address] [facility] [address]          Discharge Medication List as of 8/22/2021  3:48 AM      CONTINUE these medications which have NOT CHANGED    Details   benzoyl peroxide-erythromycin (BENZAMYCIN) gel Apply topically 2 (two) times a day, Starting Sun 6/28/2020, Normal      hydrocortisone (ANUSOL-HC) 2 5 % rectal cream Apply topically 2 (two) times a day, Starting Sun 6/28/2020, Normal      naproxen (NAPROSYN) 500 mg tablet Take 1 tablet (500 mg total) by mouth 2 (two) times a day with meals, Starting Mon 11/18/2019, Normal      polyethylene glycol (GLYCOLAX) 17 GM/SCOOP powder Take 17 g by mouth daily, Starting Sun 6/28/2020, Normal           No discharge procedures on file      PDMP Review     None          ED Provider  Electronically Signed by           Oscar Ortiz PA-C  08/26/21 0004

## 2021-08-23 LAB
C TRACH DNA SPEC QL NAA+PROBE: NEGATIVE
N GONORRHOEA DNA SPEC QL NAA+PROBE: NEGATIVE

## 2023-09-28 ENCOUNTER — HOSPITAL ENCOUNTER (INPATIENT)
Facility: HOSPITAL | Age: 28
LOS: 4 days | Discharge: OTHER INSTITUTION | DRG: 773 | End: 2023-10-02
Attending: EMERGENCY MEDICINE | Admitting: EMERGENCY MEDICINE
Payer: COMMERCIAL

## 2023-09-28 ENCOUNTER — APPOINTMENT (EMERGENCY)
Dept: ULTRASOUND IMAGING | Facility: HOSPITAL | Age: 28
End: 2023-09-28

## 2023-09-28 ENCOUNTER — HOSPITAL ENCOUNTER (EMERGENCY)
Facility: HOSPITAL | Age: 28
End: 2023-09-28
Attending: EMERGENCY MEDICINE

## 2023-09-28 VITALS
TEMPERATURE: 99 F | DIASTOLIC BLOOD PRESSURE: 77 MMHG | HEIGHT: 69 IN | WEIGHT: 160 LBS | HEART RATE: 58 BPM | RESPIRATION RATE: 18 BRPM | BODY MASS INDEX: 23.7 KG/M2 | SYSTOLIC BLOOD PRESSURE: 137 MMHG | OXYGEN SATURATION: 100 %

## 2023-09-28 DIAGNOSIS — F11.93 OPIATE WITHDRAWAL (HCC): Primary | ICD-10-CM

## 2023-09-28 DIAGNOSIS — F11.20 OPIOID USE DISORDER, SEVERE, DEPENDENCE (HCC): Primary | ICD-10-CM

## 2023-09-28 PROBLEM — R82.4 KETONURIA: Status: ACTIVE | Noted: 2023-09-28

## 2023-09-28 PROBLEM — F17.200 TOBACCO USE DISORDER: Status: ACTIVE | Noted: 2023-09-28

## 2023-09-28 LAB
ALBUMIN SERPL BCP-MCNC: 4.7 G/DL (ref 3.5–5)
ALP SERPL-CCNC: 51 U/L (ref 34–104)
ALT SERPL W P-5'-P-CCNC: 14 U/L (ref 7–52)
ANION GAP SERPL CALCULATED.3IONS-SCNC: 10 MMOL/L
AST SERPL W P-5'-P-CCNC: 14 U/L (ref 13–39)
ATRIAL RATE: 81 BPM
BACTERIA UR QL AUTO: ABNORMAL /HPF
BASOPHILS # BLD AUTO: 0.02 THOUSANDS/ÂΜL (ref 0–0.1)
BASOPHILS NFR BLD AUTO: 0 % (ref 0–1)
BILIRUB SERPL-MCNC: 0.66 MG/DL (ref 0.2–1)
BILIRUB UR QL STRIP: NEGATIVE
BUN SERPL-MCNC: 7 MG/DL (ref 5–25)
CALCIUM SERPL-MCNC: 10.2 MG/DL (ref 8.4–10.2)
CHLORIDE SERPL-SCNC: 106 MMOL/L (ref 96–108)
CLARITY UR: CLEAR
CO2 SERPL-SCNC: 26 MMOL/L (ref 21–32)
COLOR UR: YELLOW
CREAT SERPL-MCNC: 0.79 MG/DL (ref 0.6–1.3)
EOSINOPHIL # BLD AUTO: 0 THOUSAND/ÂΜL (ref 0–0.61)
EOSINOPHIL NFR BLD AUTO: 0 % (ref 0–6)
ERYTHROCYTE [DISTWIDTH] IN BLOOD BY AUTOMATED COUNT: 12.1 % (ref 11.6–15.1)
GFR SERPL CREATININE-BSD FRML MDRD: 122 ML/MIN/1.73SQ M
GLUCOSE SERPL-MCNC: 107 MG/DL (ref 65–140)
GLUCOSE UR STRIP-MCNC: NEGATIVE MG/DL
HCT VFR BLD AUTO: 43.2 % (ref 36.5–49.3)
HGB BLD-MCNC: 14.9 G/DL (ref 12–17)
HGB UR QL STRIP.AUTO: NEGATIVE
IMM GRANULOCYTES # BLD AUTO: 0.03 THOUSAND/UL (ref 0–0.2)
IMM GRANULOCYTES NFR BLD AUTO: 0 % (ref 0–2)
KETONES UR STRIP-MCNC: ABNORMAL MG/DL
LEUKOCYTE ESTERASE UR QL STRIP: NEGATIVE
LYMPHOCYTES # BLD AUTO: 0.92 THOUSANDS/ÂΜL (ref 0.6–4.47)
LYMPHOCYTES NFR BLD AUTO: 11 % (ref 14–44)
MAGNESIUM SERPL-MCNC: 2 MG/DL (ref 1.9–2.7)
MCH RBC QN AUTO: 29.4 PG (ref 26.8–34.3)
MCHC RBC AUTO-ENTMCNC: 34.5 G/DL (ref 31.4–37.4)
MCV RBC AUTO: 85 FL (ref 82–98)
MONOCYTES # BLD AUTO: 0.49 THOUSAND/ÂΜL (ref 0.17–1.22)
MONOCYTES NFR BLD AUTO: 6 % (ref 4–12)
MUCOUS THREADS UR QL AUTO: ABNORMAL
NEUTROPHILS # BLD AUTO: 6.85 THOUSANDS/ÂΜL (ref 1.85–7.62)
NEUTS SEG NFR BLD AUTO: 83 % (ref 43–75)
NITRITE UR QL STRIP: NEGATIVE
NON-SQ EPI CELLS URNS QL MICRO: ABNORMAL /HPF
NRBC BLD AUTO-RTO: 0 /100 WBCS
P AXIS: 82 DEGREES
PH UR STRIP.AUTO: 8 [PH]
PLATELET # BLD AUTO: 293 THOUSANDS/UL (ref 149–390)
PMV BLD AUTO: 9.5 FL (ref 8.9–12.7)
POTASSIUM SERPL-SCNC: 3.5 MMOL/L (ref 3.5–5.3)
PR INTERVAL: 122 MS
PROT SERPL-MCNC: 7.9 G/DL (ref 6.4–8.4)
PROT UR STRIP-MCNC: ABNORMAL MG/DL
QRS AXIS: 97 DEGREES
QRSD INTERVAL: 100 MS
QT INTERVAL: 396 MS
QTC INTERVAL: 460 MS
RBC # BLD AUTO: 5.06 MILLION/UL (ref 3.88–5.62)
RBC #/AREA URNS AUTO: ABNORMAL /HPF
SODIUM SERPL-SCNC: 142 MMOL/L (ref 135–147)
SP GR UR STRIP.AUTO: 1.02 (ref 1–1.03)
T WAVE AXIS: 59 DEGREES
UROBILINOGEN UR STRIP-ACNC: 3 MG/DL
VENTRICULAR RATE: 81 BPM
WBC # BLD AUTO: 8.31 THOUSAND/UL (ref 4.31–10.16)
WBC #/AREA URNS AUTO: ABNORMAL /HPF

## 2023-09-28 PROCEDURE — 96374 THER/PROPH/DIAG INJ IV PUSH: CPT

## 2023-09-28 PROCEDURE — 96361 HYDRATE IV INFUSION ADD-ON: CPT

## 2023-09-28 PROCEDURE — 96375 TX/PRO/DX INJ NEW DRUG ADDON: CPT

## 2023-09-28 PROCEDURE — HZ2ZZZZ DETOXIFICATION SERVICES FOR SUBSTANCE ABUSE TREATMENT: ICD-10-PCS | Performed by: EMERGENCY MEDICINE

## 2023-09-28 PROCEDURE — 99285 EMERGENCY DEPT VISIT HI MDM: CPT | Performed by: EMERGENCY MEDICINE

## 2023-09-28 PROCEDURE — 96376 TX/PRO/DX INJ SAME DRUG ADON: CPT

## 2023-09-28 PROCEDURE — 76870 US EXAM SCROTUM: CPT

## 2023-09-28 PROCEDURE — 85025 COMPLETE CBC W/AUTO DIFF WBC: CPT | Performed by: EMERGENCY MEDICINE

## 2023-09-28 PROCEDURE — 80053 COMPREHEN METABOLIC PANEL: CPT | Performed by: EMERGENCY MEDICINE

## 2023-09-28 PROCEDURE — 81001 URINALYSIS AUTO W/SCOPE: CPT | Performed by: EMERGENCY MEDICINE

## 2023-09-28 PROCEDURE — 36415 COLL VENOUS BLD VENIPUNCTURE: CPT | Performed by: EMERGENCY MEDICINE

## 2023-09-28 PROCEDURE — 93005 ELECTROCARDIOGRAM TRACING: CPT

## 2023-09-28 PROCEDURE — 83735 ASSAY OF MAGNESIUM: CPT | Performed by: EMERGENCY MEDICINE

## 2023-09-28 PROCEDURE — 93010 ELECTROCARDIOGRAM REPORT: CPT | Performed by: INTERNAL MEDICINE

## 2023-09-28 PROCEDURE — 99285 EMERGENCY DEPT VISIT HI MDM: CPT

## 2023-09-28 RX ORDER — ACETAMINOPHEN 325 MG/1
650 TABLET ORAL EVERY 6 HOURS PRN
Status: DISCONTINUED | OUTPATIENT
Start: 2023-09-28 | End: 2023-10-02 | Stop reason: HOSPADM

## 2023-09-28 RX ORDER — LORAZEPAM 2 MG/ML
1 INJECTION INTRAMUSCULAR ONCE
Status: COMPLETED | OUTPATIENT
Start: 2023-09-28 | End: 2023-09-28

## 2023-09-28 RX ORDER — ONDANSETRON 2 MG/ML
4 INJECTION INTRAMUSCULAR; INTRAVENOUS EVERY 6 HOURS PRN
Status: DISCONTINUED | OUTPATIENT
Start: 2023-09-28 | End: 2023-10-02 | Stop reason: HOSPADM

## 2023-09-28 RX ORDER — ONDANSETRON 2 MG/ML
4 INJECTION INTRAMUSCULAR; INTRAVENOUS ONCE
Status: COMPLETED | OUTPATIENT
Start: 2023-09-28 | End: 2023-09-28

## 2023-09-28 RX ORDER — LOPERAMIDE HYDROCHLORIDE 2 MG/1
2 CAPSULE ORAL EVERY 4 HOURS PRN
Status: DISCONTINUED | OUTPATIENT
Start: 2023-09-28 | End: 2023-09-29

## 2023-09-28 RX ORDER — KETOROLAC TROMETHAMINE 30 MG/ML
15 INJECTION, SOLUTION INTRAMUSCULAR; INTRAVENOUS ONCE
Status: COMPLETED | OUTPATIENT
Start: 2023-09-28 | End: 2023-09-28

## 2023-09-28 RX ORDER — CLONAZEPAM 1 MG/1
1 TABLET ORAL EVERY 6 HOURS PRN
Status: DISCONTINUED | OUTPATIENT
Start: 2023-09-28 | End: 2023-10-01

## 2023-09-28 RX ORDER — BUPRENORPHINE AND NALOXONE 2; .5 MG/1; MG/1
1 FILM, SOLUBLE BUCCAL; SUBLINGUAL DAILY
Status: DISCONTINUED | OUTPATIENT
Start: 2023-09-28 | End: 2023-09-28

## 2023-09-28 RX ORDER — GABAPENTIN 300 MG/1
300 CAPSULE ORAL EVERY 8 HOURS PRN
Status: DISCONTINUED | OUTPATIENT
Start: 2023-09-28 | End: 2023-09-30

## 2023-09-28 RX ORDER — CLONIDINE HYDROCHLORIDE 0.1 MG/1
0.2 TABLET ORAL ONCE
Status: COMPLETED | OUTPATIENT
Start: 2023-09-28 | End: 2023-09-28

## 2023-09-28 RX ORDER — ACETAMINOPHEN 325 MG/1
975 TABLET ORAL ONCE
Status: COMPLETED | OUTPATIENT
Start: 2023-09-28 | End: 2023-09-28

## 2023-09-28 RX ORDER — TRAZODONE HYDROCHLORIDE 50 MG/1
50 TABLET ORAL
Status: DISCONTINUED | OUTPATIENT
Start: 2023-09-28 | End: 2023-10-02 | Stop reason: HOSPADM

## 2023-09-28 RX ORDER — CLONIDINE HYDROCHLORIDE 0.1 MG/1
0.1 TABLET ORAL EVERY 6 HOURS PRN
Status: DISCONTINUED | OUTPATIENT
Start: 2023-09-28 | End: 2023-09-30

## 2023-09-28 RX ORDER — BUPRENORPHINE AND NALOXONE 8; 2 MG/1; MG/1
8 FILM, SOLUBLE BUCCAL; SUBLINGUAL DAILY
Status: DISCONTINUED | OUTPATIENT
Start: 2023-09-28 | End: 2023-09-28

## 2023-09-28 RX ORDER — BUPRENORPHINE AND NALOXONE 2; .5 MG/1; MG/1
4 FILM, SOLUBLE BUCCAL; SUBLINGUAL DAILY
Status: DISCONTINUED | OUTPATIENT
Start: 2023-09-28 | End: 2023-09-28

## 2023-09-28 RX ORDER — DICYCLOMINE HCL 20 MG
20 TABLET ORAL ONCE
Status: COMPLETED | OUTPATIENT
Start: 2023-09-28 | End: 2023-09-28

## 2023-09-28 RX ORDER — MAGNESIUM HYDROXIDE/ALUMINUM HYDROXICE/SIMETHICONE 120; 1200; 1200 MG/30ML; MG/30ML; MG/30ML
30 SUSPENSION ORAL EVERY 6 HOURS PRN
Status: DISCONTINUED | OUTPATIENT
Start: 2023-09-28 | End: 2023-10-02 | Stop reason: HOSPADM

## 2023-09-28 RX ORDER — NICOTINE 21 MG/24HR
1 PATCH, TRANSDERMAL 24 HOURS TRANSDERMAL DAILY
Status: DISCONTINUED | OUTPATIENT
Start: 2023-09-29 | End: 2023-10-02 | Stop reason: HOSPADM

## 2023-09-28 RX ADMIN — DICYCLOMINE HYDROCHLORIDE 20 MG: 20 TABLET ORAL at 11:44

## 2023-09-28 RX ADMIN — ONDANSETRON 4 MG: 2 INJECTION INTRAMUSCULAR; INTRAVENOUS at 11:44

## 2023-09-28 RX ADMIN — LORAZEPAM 1 MG: 2 INJECTION INTRAMUSCULAR; INTRAVENOUS at 20:11

## 2023-09-28 RX ADMIN — ACETAMINOPHEN 975 MG: 325 TABLET, FILM COATED ORAL at 11:44

## 2023-09-28 RX ADMIN — SODIUM CHLORIDE 1000 ML: 0.9 INJECTION, SOLUTION INTRAVENOUS at 11:46

## 2023-09-28 RX ADMIN — KETOROLAC TROMETHAMINE 15 MG: 30 INJECTION, SOLUTION INTRAMUSCULAR; INTRAVENOUS at 11:44

## 2023-09-28 RX ADMIN — LORAZEPAM 1 MG: 2 INJECTION INTRAMUSCULAR; INTRAVENOUS at 13:05

## 2023-09-28 RX ADMIN — CLONIDINE HYDROCHLORIDE 0.2 MG: 0.1 TABLET ORAL at 11:59

## 2023-09-28 NOTE — ED PROVIDER NOTES
History  Chief Complaint   Patient presents with   • Withdrawal - Drug     Patient states he presents due to going through fentanyl withdrawal. Last use about 36 hours ago. Patient is a 31-year-old male past medical history of substance abuse presenting for fentanyl withdrawal.  Patient states that he normally uses 5 bags a day fentanyl and his last use was 36 hours ago. He notes generalized abdominal pain, tearing general pain, nausea and 3 episodes of nonbloody bilious vomit, insomnia and headache. He states that this is consistent with prior withdrawal symptoms however the vomiting and diffuse shaking are new. Does note palpitations as well. Has not taken any medication for it does not use any other drugs. Drinks socially and is not a daily drinker with no history of withdrawal seizures. Does smoke. Last time that he was in rehab or detox was 1 year ago and states he has used Suboxone in the past and would be willing to use it again. Prior to Admission Medications   Prescriptions Last Dose Informant Patient Reported? Taking?   benzoyl peroxide-erythromycin (BENZAMYCIN) gel   No No   Sig: Apply topically 2 (two) times a day   hydrocortisone (ANUSOL-HC) 2.5 % rectal cream   No No   Sig: Apply topically 2 (two) times a day   naproxen (NAPROSYN) 500 mg tablet  Self No No   Sig: Take 1 tablet (500 mg total) by mouth 2 (two) times a day with meals   polyethylene glycol (GLYCOLAX) 17 GM/SCOOP powder   No No   Sig: Take 17 g by mouth daily      Facility-Administered Medications: None       History reviewed. No pertinent past medical history. History reviewed. No pertinent surgical history. Family History   Problem Relation Age of Onset   • No Known Problems Mother    • Heart disease Father      I have reviewed and agree with the history as documented.     E-Cigarette/Vaping   • E-Cigarette Use Never User      E-Cigarette/Vaping Substances   • Nicotine No    • THC No    • CBD No    • Flavoring No • Other No    • Unknown No      Social History     Tobacco Use   • Smoking status: Every Day     Packs/day: 1.00     Years: 5.00     Total pack years: 5.00     Types: Cigarettes   • Smokeless tobacco: Never   Vaping Use   • Vaping Use: Never used   Substance Use Topics   • Alcohol use: Yes     Alcohol/week: 6.0 standard drinks of alcohol     Types: 6 Standard drinks or equivalent per week   • Drug use: Yes     Types: Marijuana, Fentanyl       Review of Systems   All other systems reviewed and are negative. Physical Exam  Physical Exam  Vitals reviewed. Constitutional:       General: He is not in acute distress. Appearance: Normal appearance. He is not ill-appearing. HENT:      Mouth/Throat:      Mouth: Mucous membranes are moist.   Eyes:      Extraocular Movements: Extraocular movements intact. Conjunctiva/sclera: Conjunctivae normal.   Cardiovascular:      Rate and Rhythm: Normal rate and regular rhythm. Heart sounds: Normal heart sounds. Pulmonary:      Effort: Pulmonary effort is normal.      Breath sounds: Normal breath sounds. Abdominal:      General: Abdomen is flat. Tenderness: There is abdominal tenderness. There is guarding. Comments: Diffuse tenderness with voluntary guarding   Musculoskeletal:         General: No swelling. Normal range of motion. Cervical back: Neck supple. Skin:     General: Skin is warm and dry. Neurological:      General: No focal deficit present. Mental Status: He is alert. Motor: No weakness.       Comments: Mild tremor   Psychiatric:         Mood and Affect: Mood normal.         Vital Signs  ED Triage Vitals [09/28/23 1015]   Temperature Pulse Respirations Blood Pressure SpO2   99 °F (37.2 °C) (!) 107 18 164/73 98 %      Temp Source Heart Rate Source Patient Position - Orthostatic VS BP Location FiO2 (%)   Temporal -- Sitting Left arm --      Pain Score       --           Vitals:    09/28/23 1015   BP: 164/73   Pulse: (!) 107 Patient Position - Orthostatic VS: Sitting         Visual Acuity      ED Medications  Medications   sodium chloride 0.9 % bolus 1,000 mL (has no administration in time range)   ondansetron (ZOFRAN) injection 4 mg (has no administration in time range)   cloNIDine (CATAPRES) tablet 0.2 mg (has no administration in time range)   dicyclomine (BENTYL) tablet 20 mg (has no administration in time range)   ketorolac (TORADOL) injection 15 mg (has no administration in time range)   acetaminophen (TYLENOL) tablet 975 mg (has no administration in time range)       Diagnostic Studies  Results Reviewed     Procedure Component Value Units Date/Time    Comprehensive metabolic panel [[id number]]     Lab Status: No result Specimen: Blood     CBC and differential [[id number]]     Lab Status: No result Specimen: Blood     Magnesium [[id number]]     Lab Status: No result Specimen: Blood                  No orders to display              Procedures  Procedures         ED Course  ED Course as of 09/28/23 2004   Thu Sep 28, 2023   1136 Patient states that he has had pain to his genitals with withdrawal in the past but is not sure whether or not that is related today, does have some tenderness to palpation left testicle, will obtain ultrasound to rule out torsion. 1342 Work-up unremarkable, will transfer to detox unit. Medical Decision Making  Patient is a 80-year-old male past medical history of substance abuse presenting in fentanyl withdrawal.  Patient is well-appearing at bedside though with low-grade tachycardia, mild tremor, diffuse abdominal tenderness with voluntary guarding and no other significant physical exam findings.   As patient states that this is consistent with his prior withdrawal symptoms do not feel that he requires abdominal imaging at this time however will treat symptomatically and reassess, will obtain labs to assess for electrolyte abnormalities, anemia given vomiting and discussed with detox. Amount and/or Complexity of Data Reviewed  Labs: ordered. Risk  OTC drugs. Prescription drug management. Disposition  Final diagnoses:   None     ED Disposition     None      Follow-up Information    None         Patient's Medications   Discharge Prescriptions    No medications on file       No discharge procedures on file.     PDMP Review     None          ED Provider  Electronically Signed by           Michael Chen DO  09/28/23 2004

## 2023-09-28 NOTE — EMTALA/ACUTE CARE TRANSFER
[address] [facility] [address]  Dept: [phone]      QQVRAT TRANSFER CONSENT    NAME Lexi Mullins                                         1995                              MRN [id number]    I have been informed of my rights regarding examination, treatment, and transfer   by Dr. Dalton Dewey DO    Benefits: Specialized equipment and/or services available at the receiving facility (Include comment)________________________, Other benefits (Include comment)_______________________Toxicology    Risks: Potential for delay in receiving treatment, Other: (Include comment)__________________________      Transfer Request   I acknowledge that my medical condition has been evaluated and explained to me by the emergency department physician or other qualified medical person and/or my attending physician who has recommended and offered to me further medical examination and treatment. I understand the Hospital's obligation with respect to the treatment and stabilization of my emergency medical condition. I nevertheless request to be transferred. I release the Hospital, the doctor, and any other persons caring for me from all responsibility or liability for any injury or ill effects that may result from my transfer and agree to accept all responsibility for the consequences of my choice to transfer, rather than receive stabilizing treatment at the Hospital. I understand that because the transfer is my request, my insurance may not provide reimbursement for the services. The Hospital will assist and direct me and my family in how to make arrangements for transfer, but the hospital is not liable for any fees charged by the transport service. In spite of this understanding, I refuse to consent to further medical examination and treatment which has been offered to me, and request transfer to [address] Name, [address] [facility] : [facility].  I authorize the performance of emergency medical procedures and treatments upon me in both transit and upon arrival at the receiving facility. Additionally, I authorize the release of any and all medical records to the receiving facility and request they be transported with me, if possible. I authorize the performance of emergency medical procedures and treatments upon me in both transit and upon arrival at the receiving facility. Additionally, I authorize the release of any and all medical records to the receiving facility and request they be transported with me, if possible. I understand that the safest mode of transportation during a medical emergency is an ambulance and that the Hospital advocates the use of this mode of transport. Risks of traveling to the receiving facility by car, including absence of medical control, life sustaining equipment, such as oxygen, and medical personnel has been explained to me and I fully understand them. (KEVON CORRECT BOX BELOW)  [  ]  I consent to the stated transfer and to be transported by ambulance/helicopter. [  ]  I consent to the stated transfer, but refuse transportation by ambulance and accept full responsibility for my transportation by car. I understand the risks of non-ambulance transfers and I exonerate the Hospital and its staff from any deterioration in my condition that results from this refusal.    X___________________________________________    DATE  23  TIME________  Signature of patient or legally responsible individual signing on patient behalf           RELATIONSHIP TO PATIENT_________________________          Provider Certification    NAME Thompson Tirado                                        [facility] 1995                              MRN [id number]    A medical screening exam was performed on the above named patient.   Based on the examination:    Condition Necessitating Transfer The encounter diagnosis was Opiate withdrawal ([address]). Patient Condition: The patient has been stabilized such that within reasonable medical probability, no material deterioration of the patient condition or the condition of the unborn child(vera) is likely to result from the transfer    Reason for Transfer: Level of Care needed not available at this facility, Other (Include comment)____________________    Transfer Requirements: Facility [facility]   · Space available and qualified personnel available for treatment as acknowledged by PACS  · Agreed to accept transfer and to provide appropriate medical treatment as acknowledged by       [place]  · Appropriate medical records of the examination and treatment of the patient are provided at the time of transfer   [address] _______  · Transfer will be performed by qualified personnel from    and appropriate transfer equipment as required, including the use of necessary and appropriate life support measures.     Provider Certification: I have examined the patient and explained the following risks and benefits of being transferred/refusing transfer to the patient/family:  General risk, such as traffic hazards, adverse weather conditions, rough terrain or turbulence, possible failure of equipment (including vehicle or aircraft), or consequences of actions of persons outside the control of the transport personnel, Unanticipated needs of medical equipment and personnel during transport, Risk of worsening condition, The possibility of a transport vehicle being unavailable      Based on these reasonable risks and benefits to the patient and/or the unborn child(vera), and based upon the information available at the time of the patient’s examination, I certify that the medical benefits reasonably to be expected from the provision of appropriate medical treatments at another medical facility outweigh the increasing risks, if any, to the individual’s medical condition, and in the case of labor to the unborn child, from effecting the transfer.     X____________________________________________ DATE 09/28/23        TIME_______      ORIGINAL - SEND TO MEDICAL RECORDS   COPY - SEND WITH PATIENT DURING TRANSFER

## 2023-09-28 NOTE — ED NOTES
Pt refused to answer anymore SBIRT questions at this time. Pt here for detox evaluation.       Kalpana Ellis  09/28/23 1749

## 2023-09-29 LAB
AMPHETAMINES SERPL QL SCN: NEGATIVE
ANION GAP SERPL CALCULATED.3IONS-SCNC: 10 MMOL/L
BARBITURATES UR QL: NEGATIVE
BENZODIAZ UR QL: NEGATIVE
BUN SERPL-MCNC: 10 MG/DL (ref 5–25)
CALCIUM SERPL-MCNC: 9.2 MG/DL (ref 8.4–10.2)
CHLORIDE SERPL-SCNC: 105 MMOL/L (ref 96–108)
CHOLEST SERPL-MCNC: 112 MG/DL
CO2 SERPL-SCNC: 25 MMOL/L (ref 21–32)
COCAINE UR QL: NEGATIVE
CREAT SERPL-MCNC: 0.76 MG/DL (ref 0.6–1.3)
ERYTHROCYTE [DISTWIDTH] IN BLOOD BY AUTOMATED COUNT: 12.1 % (ref 11.6–15.1)
EST. AVERAGE GLUCOSE BLD GHB EST-MCNC: 111 MG/DL
GFR SERPL CREATININE-BSD FRML MDRD: 124 ML/MIN/1.73SQ M
GLUCOSE SERPL-MCNC: 120 MG/DL (ref 65–140)
HAV IGM SER QL: NORMAL
HBA1C MFR BLD: 5.5 %
HBV CORE IGM SER QL: NORMAL
HBV SURFACE AG SER QL: NORMAL
HCT VFR BLD AUTO: 41.1 % (ref 36.5–49.3)
HCV AB SER QL: NORMAL
HDLC SERPL-MCNC: 52 MG/DL
HGB BLD-MCNC: 14 G/DL (ref 12–17)
HIV 1+2 AB+HIV1 P24 AG SERPL QL IA: NORMAL
HIV1 P24 AG SER QL: NORMAL
LDLC SERPL CALC-MCNC: 44 MG/DL (ref 0–100)
MAGNESIUM SERPL-MCNC: 2 MG/DL (ref 1.9–2.7)
MCH RBC QN AUTO: 29.5 PG (ref 26.8–34.3)
MCHC RBC AUTO-ENTMCNC: 34.1 G/DL (ref 31.4–37.4)
MCV RBC AUTO: 87 FL (ref 82–98)
METHADONE UR QL: NEGATIVE
OPIATES UR QL SCN: NEGATIVE
OXYCODONE+OXYMORPHONE UR QL SCN: NEGATIVE
PCP UR QL: NEGATIVE
PLATELET # BLD AUTO: 260 THOUSANDS/UL (ref 149–390)
PMV BLD AUTO: 10.2 FL (ref 8.9–12.7)
POTASSIUM SERPL-SCNC: 3.4 MMOL/L (ref 3.5–5.3)
RBC # BLD AUTO: 4.74 MILLION/UL (ref 3.88–5.62)
SODIUM SERPL-SCNC: 140 MMOL/L (ref 135–147)
THC UR QL: POSITIVE
TRIGL SERPL-MCNC: 78 MG/DL
WBC # BLD AUTO: 8 THOUSAND/UL (ref 4.31–10.16)

## 2023-09-29 PROCEDURE — 99223 1ST HOSP IP/OBS HIGH 75: CPT | Performed by: EMERGENCY MEDICINE

## 2023-09-29 PROCEDURE — 80048 BASIC METABOLIC PNL TOTAL CA: CPT

## 2023-09-29 PROCEDURE — 87806 HIV AG W/HIV1&2 ANTB W/OPTIC: CPT | Performed by: EMERGENCY MEDICINE

## 2023-09-29 PROCEDURE — 80307 DRUG TEST PRSMV CHEM ANLYZR: CPT

## 2023-09-29 PROCEDURE — 83735 ASSAY OF MAGNESIUM: CPT

## 2023-09-29 PROCEDURE — 80074 ACUTE HEPATITIS PANEL: CPT | Performed by: EMERGENCY MEDICINE

## 2023-09-29 PROCEDURE — 83036 HEMOGLOBIN GLYCOSYLATED A1C: CPT

## 2023-09-29 PROCEDURE — 85027 COMPLETE CBC AUTOMATED: CPT

## 2023-09-29 PROCEDURE — 80061 LIPID PANEL: CPT

## 2023-09-29 RX ORDER — DEXMEDETOMIDINE HYDROCHLORIDE 4 UG/ML
.1-.7 INJECTION, SOLUTION INTRAVENOUS
Status: DISCONTINUED | OUTPATIENT
Start: 2023-09-29 | End: 2023-09-30

## 2023-09-29 RX ORDER — LANOLIN ALCOHOL/MO/W.PET/CERES
6 CREAM (GRAM) TOPICAL
Status: DISCONTINUED | OUTPATIENT
Start: 2023-09-29 | End: 2023-10-02 | Stop reason: HOSPADM

## 2023-09-29 RX ORDER — BUPRENORPHINE AND NALOXONE 8; 2 MG/1; MG/1
8 FILM, SOLUBLE BUCCAL; SUBLINGUAL DAILY
Status: DISCONTINUED | OUTPATIENT
Start: 2023-09-29 | End: 2023-09-29

## 2023-09-29 RX ORDER — BUPRENORPHINE AND NALOXONE 8; 2 MG/1; MG/1
8 FILM, SOLUBLE BUCCAL; SUBLINGUAL 2 TIMES DAILY
Status: DISCONTINUED | OUTPATIENT
Start: 2023-09-29 | End: 2023-10-02 | Stop reason: HOSPADM

## 2023-09-29 RX ORDER — BUPRENORPHINE 2 MG/1
0.5 TABLET SUBLINGUAL
Status: DISCONTINUED | OUTPATIENT
Start: 2023-09-29 | End: 2023-09-29

## 2023-09-29 RX ORDER — BUPRENORPHINE 20 UG/H
20 PATCH TRANSDERMAL
Status: DISCONTINUED | OUTPATIENT
Start: 2023-09-29 | End: 2023-09-29

## 2023-09-29 RX ADMIN — BUPRENORPHINE AND NALOXONE 8 MG: 8; 2 FILM BUCCAL; SUBLINGUAL at 11:40

## 2023-09-29 RX ADMIN — NICOTINE 1 PATCH: 21 PATCH, EXTENDED RELEASE TRANSDERMAL at 08:01

## 2023-09-29 RX ADMIN — GABAPENTIN 300 MG: 300 CAPSULE ORAL at 03:53

## 2023-09-29 RX ADMIN — DEXMEDETOMIDINE 0.2 MCG/KG/HR: 100 INJECTION, SOLUTION, CONCENTRATE INTRAVENOUS at 12:40

## 2023-09-29 RX ADMIN — ALUMINUM HYDROXIDE, MAGNESIUM HYDROXIDE, AND SIMETHICONE 30 ML: 200; 200; 20 SUSPENSION ORAL at 08:01

## 2023-09-29 RX ADMIN — ACETAMINOPHEN 650 MG: 325 TABLET ORAL at 08:01

## 2023-09-29 RX ADMIN — ONDANSETRON 4 MG: 2 INJECTION INTRAMUSCULAR; INTRAVENOUS at 03:53

## 2023-09-29 RX ADMIN — Medication 0.5 MG: at 05:00

## 2023-09-29 RX ADMIN — Medication 6 MG: at 22:29

## 2023-09-29 RX ADMIN — DEXMEDETOMIDINE 0.4 MCG/KG/HR: 100 INJECTION, SOLUTION, CONCENTRATE INTRAVENOUS at 22:17

## 2023-09-29 RX ADMIN — ONDANSETRON 4 MG: 2 INJECTION INTRAMUSCULAR; INTRAVENOUS at 12:14

## 2023-09-29 RX ADMIN — NICOTINE POLACRILEX 4 MG: 4 GUM, CHEWING BUCCAL at 20:22

## 2023-09-29 RX ADMIN — BUPRENORPHINE 20 MCG: 20 PATCH, EXTENDED RELEASE TRANSDERMAL at 00:09

## 2023-09-29 RX ADMIN — CLONAZEPAM 1 MG: 1 TABLET ORAL at 22:29

## 2023-09-29 RX ADMIN — Medication 0.5 MG: at 08:00

## 2023-09-29 RX ADMIN — GABAPENTIN 300 MG: 300 CAPSULE ORAL at 22:29

## 2023-09-29 RX ADMIN — Medication 0.5 MG: at 09:51

## 2023-09-29 RX ADMIN — TRAZODONE HYDROCHLORIDE 50 MG: 50 TABLET ORAL at 22:29

## 2023-09-29 NOTE — H&P
[facility]  H&P  Name: Giancarlo Guo y.o. male I MRN: [id number]  Unit/Bed#: 5T DETOX 466-76 I Date of Admission: 9/28/2023   Date of Service: 9/29/2023 I Hospital Day: 0  HISTORY & PHYSICAL EXAM  DEPARTMENT OF MEDICAL TOXICOLOGY  LEVEL 4 MEDICAL DETOX UNIT  Giancarlo Campos 29 y.o. male MRN: [id number]  Unit/Bed#: 5T DETOX 513-01 Encounter: [id number]      Reason for Admission/Principal Problem: Opioid withdrawal, opioid use disorder  Admitting Provider: William Tomas PA-C  Attending Provider: Evans Vega DO   9/28/2023 11:39 PM      * Opioid withdrawal ([address])  Assessment & Plan  · Patient with a history of chronic opioid use  · Last use was over 9/27 at 0500  · Initiate MAT/opioid withdrawal protocol with plan for eventual microinduction with Suboxone  · Currently pt is resting comfortably after receiving supportive care in the ED, VSS, and pt in no acute distress due to opioid withdrawal sx  · Initiate Butrans 20 mcg/hr patch   · Continue monitoring opioid withdrawal, and plan for microinduction likely in the AM when more alert, as >36 hours have already passed since pt's last opioid use  · Symptomatic and supportive care  · Continuous pulse oximetry monitoring    Opioid use disorder, severe, dependence ([address])  Assessment & Plan  · Patient with a history of chronic fentanyl use  · Uses about 5 bags daily via smoking  · Denies h/o IVDU  · History of prior Suboxone MAT  · Management of opioid withdrawal under MAT protocol as above  · Case management consulted for assistance with aftercare resources - likely OP MAT after d/c    Ketonuria  Assessment & Plan  · Pt with 3+ ketonuria on initial screening workup in the ED  · No current urinary complaints and normal anion gap on CMP  · He is in need of dyslipidemia and diabetes mellitus screening per chart review and does not appear to have sufficient PCP f/u  · Lipid panel and Hgb A1c ordered for the AM    Need for lipid screening  Assessment & Plan  · Pt in need of dyslipidemia screening per chart review and does not appear to have sufficient PCP f/u at this time  · Lipid panel ordered for the AM  · Recommend OP f/u with PCP    Diabetes mellitus screening  Assessment & Plan  · Hgb A1C ordered for the AM  · Recommend OP f/u with PCP  · See also A&P above under ketonuria    Tobacco use disorder  Assessment & Plan  · Pt reports smoking about 1 ppd of cigarettes  · Offered and accepted NRT   · Encourage cessation          Additional medical treatment(s) includes as above       VTE Prophylaxis: Pharmacologic VTE Prophylaxis contraindicated due to low risk  / reason for no mechanical VTE prophylaxis low risk for VTE, will encourage ambulation   Code Status: full code      Anticipated Length of Stay:  Patient will be admitted on an Inpatient basis with an anticipated length of stay of  >2  midnights. Justification for Hospital Stay: opioid withdrawal, opioid use disorder, ketonuria, need for health maintenance screening with inadequate PCP f/u    For any questions or concerns, please Tiger Text the advanced practitioner in the role of [facility]-DETOX-AP On Call      This patient qualifies for Level IV medically managed intensive inpatient services under the criteria set by the American Society of Addiction Medicine, including dimensions 1-3. The patient is in withdrawal (or is intoxicated with high risk of withdrawal), with severe and unstable medical and/or psychiatric (dual diagnosis) problems, requiring requires 24-hour medical and nursing care and the full resources of a licensed hospital.          [facility] patient to medical detox unit and continue supportive care and stabilization of acute opioid withdrawal per medical toxicology/detox medication assisted treatment pathway.      Complicated Opioid Withdrawal (ie associated with long acting agents, such as methadone or illicit fentanyl analogues):  • >72 hours: Routine COWS/induction as per uncomplicated opoid withdrawal (below)  • <72 hours:  • Adjunctive medications (see below), including clonazepam 1-2mg Q6 hrs PRN anxiety (or diazepam 5 mg if cannot take PO)  • >48 hours, test dose buprenorphine 0.5-1mg. • If responds well, continue with 2mg Q2 hrs (total 8mg) holding for worsening withdrawal, then start maintenance dosing   • If responds poorly, follow next step below   • <48 hours and/or COWS < 6 or failed test dose, add Butrans transdermal patch 20-40mcg/hr, monitor for signs/symptoms of withdrawal   • If within first 24-48 hrs, can administer buprenorphine 0.5-1mg Q6 hrs x 4, followed by 2mg Q2 hrs x 4 the next day  • If surpassing 48 hrs, can administer buprenorphine 2mg Q2hrs if tolerated without transdermal patch or lower sublingual dose. • When complete, remove transdermal patch and start maintenance dosing   • For moderate-severe withdrawal (COWS >/= 8, may still consider routine induction with buprenorphine 8 mg if appropriate. • For worsened or precipitated withdrawal, consider adjunctive benzodiazepines and other comfort meds. Dexmedetomidine may be considered for severe precipitated withdrawal.         Uncomplicated Opioid Withdrawal:  Monitor opioid severity via Clinical Opioid Withdrawal Scale (COWS) Q4 hours and administer buprenorphine/naloxone 8mg/2mg when COWS >8, or when greater than 24 hours have elapsed from most recent opioid use (excluding long-acting opioids, such as methadone). Continue to monitor opioid severity Q30-60 minutes after first dose and administer additional buprenorphine 2-4mg every 30-60 minutes until COWS < 8 for two consecutive hours.               Adjunctive medications administered as needed:  Clonidine 0.1 mg PO Q6 hours PRN anxiety or palpitations    Gabapentin 300mg PO Q8 hours PRN anxiety    Ibuprofen 600 mg PO Q6 hours PRN pain    Acetaminophen 1000mg PO Q8 hours PRN pain    Ondansetron 4 mg PO Q6 hours PRN N/V    Nicotine patch 7, 14, 21 mg  PRN nicotine withdrawal   Trazodone 50 mg PO QHS PRN sleep    Loperamide 4 mg PO PRN diarrhea up to 16 mg/day       The risks, benefits and mechanism of buprenorphine/naloxone were discussed and patient agreed to treatment. Case management consultation will take place to assist with coordination of subsequent treatment after discharge. Administer daily multivitamin. Evaluate and treat for coexisting substance use, such as nicotine. Discuss risk factors for infectious disease, such as history of intravenous drug abuse, and offer hepatitis and HIV screening if indicated. Hx and PE limited by: pt somnolent, additional hx obtained via chart review and pt's RN    HPI: Kathy Hill is a 29y.o. year old male with a PMH of OUD and need for screening for dyslipidemia and DM due to inadequate PCP follow-up who presents with opioid withdrawal seeking detoxification. Patient initially presented to the [facility] ED requesting detoxification from opioids, preferring inpatient detox to routine Suboxone induction in the ED, and was subsequently transferred to the [facility] medical detox unit for medically assisted opioid withdrawal and Suboxone induction. Patient reports using about 5 bags of fentanyl daily via smoking. Last opioid use was 9/27 at 5 AM. He denies history of IVDU. Currently, pt is resting comfortably after receiving multiple supportive care medications in the ED prior to transfer, including a second dose of Ativan administered 9/28 at 2011. Patient agreeable to initiation of Butrans 20 mcg/hr patch and supportive care at this time with plan for microinduction with Suboxone likely in the morning once he is more alert and withdrawal sx resume. Patient's past treatment hx for OUD consists of prior Suboxone MAT. He is interested in likely OP MAT follow-up after discharge.      Opioids currently used: fentanyl  Route of use: smoking  Date/Time of Last Opioid Use: 9/27 at 5 AM  Current Signs/Symptoms of Opioid Withdrawal: none at this time; pt resting comfortably as sx adequately managed with supportive care pt received in the ED    COWS score:   Clinical Opiate Withdrawal Scale  Pulse: 66  Resting Pulse Rate: Measured After Patient is Sitting or Lying for One Minute: Pulse rate 80 or below  GI Upset: Over Last Half Hour: Stomach cramps  Sweating: Over Past Half Hour Not Accounted for by Room Temperature of Patient Activity: Subjective report of chills or flushing  Tremor: Observation of Outstretched Hands: Slight tremor observable  Restlessness: Observation During Assessment: Reports difficulty sitting still, but is able to do so  Yawning: Observation During Assessment: Yawning once or twice during assessment  Pupil Size: Pupils pinned or normal size for room light  Anxiety and Irritability: Patient obviously irritable or anxious  Bone or Joint Aches: If Patient was Having Pain Previously, Only the Additional Component Attributed to Opiate Withdrawal is Scored: Patient reports severe diffuse aching of joints/muscle  Gooseflesh Skin: Piloerection of skin can be felt or hairs standing up on arms  Runny Nose or Tearing: Not Accounted for by Cold Symptoms or Allergies: Nasal stuffiness of unusually moist eyes  Clinical Opiate Withdrawal Scale Total Score: 14          Methadone & Buprenorphine History  History of prior treatment for opioid dependence? yes  Currently on Methadone Maintenance? no  History of prior treatment with Suboxone? yes  Currently taking Suboxone? no  History of using Suboxone without having a prescription? unknown  History of IVDA? no  Co-existing substance use?  No; pt reported social alcohol use in the ED, denying daily EtOH use or any other illicit substance use    Review of PDMP: yes, no prescriptions    Social History     Substance and Sexual Activity   Alcohol Use Yes   • Alcohol/week: 6.0 standard drinks of alcohol   • Types: 6 Standard drinks or equivalent per week     Social History     Substance and Sexual Activity   Drug Use Yes   • Types: Marijuana, Fentanyl    Comment: 5 bags/day last use 9/27 @ 0500     Social History     Tobacco Use   Smoking Status Every Day   • Packs/day: 1.00   • Years: 5.00   • Total pack years: 5.00   • Types: Cigarettes   Smokeless Tobacco Never       Review of Systems   Reason unable to perform ROS: pt somnolent. Historical Information   History reviewed. No pertinent past medical history. History reviewed. No pertinent surgical history. Family History   Problem Relation Age of Onset   • No Known Problems Mother    • Heart disease Father      Social History   Marital Status: Single   Patient Pre-hospital Living Situation: stable, lives with family  Patient Pre-hospital Level of Mobility: independent  Patient Pre-hospital Diet Restrictions: none    No Known Allergies    Prior to Admission medications    Medication Sig Start Date End Date Taking?  Authorizing Provider   benzoyl peroxide-erythromycin [facility]) gel Apply topically 2 (two) times a day  Patient not taking: Reported on 9/28/2023 6/28/20   Aretha Liza Zamorano PA-C   hydrocortisone (ANUSOL-HC) 2.5 % rectal cream Apply topically 2 (two) times a day  Patient not taking: Reported on 9/28/2023 6/28/20   Aretha Liza A ISADORA Zamorano   naproxen (NAPROSYN) 500 mg tablet Take 1 tablet (500 mg total) by mouth 2 (two) times a day with meals  Patient not taking: Reported on 9/28/2023 11/18/19   [facility] Delcarloz Dye DO   polyethylene glycol (GLYCOLAX) 17 GM/SCOOP powder Take 17 g by mouth daily  Patient not taking: Reported on 9/28/2023 6/28/20   Aretha Bison SRINI Zamorano PA-C       Current Facility-Administered Medications   Medication Dose Route Frequency   • acetaminophen (TYLENOL) tablet 650 mg  650 mg Oral Q6H PRN   • aluminum-magnesium hydroxide-simethicone (MAALOX) oral suspension 30 mL  30 mL Oral Q6H PRN   • clonazePAM (KlonoPIN) tablet 1 mg  1 mg Oral Q6H PRN   • cloNIDine (CATAPRES) tablet 0.1 mg  0.1 mg Oral Q6H PRN   • gabapentin (NEURONTIN) capsule 300 mg  300 mg Oral Q8H PRN   • loperamide (IMODIUM) capsule 2 mg  2 mg Oral Q4H PRN   • multivitamin-minerals (CENTRUM) tablet 1 tablet  1 tablet Oral Daily   • nicotine (NICODERM CQ) 21 mg/24 hr TD 24 hr patch 1 patch  1 patch Transdermal Daily   • ondansetron (ZOFRAN) injection 4 mg  4 mg Intravenous Q6H PRN   • transdermal buprenorphine (BUTRANS) 20 mcg/hr TD patch 20 mcg  20 mcg Transdermal Q7 Days   • traZODone (DESYREL) tablet 50 mg  50 mg Oral HS PRN       Continuous Infusions:          Objective     No intake or output data in the 24 hours ending 09/29/23 0141    Invasive Devices:   Peripheral IV 09/28/23 Right Antecubital (Active)   Site Assessment WDL; Clean;Dry; Intact 09/28/23 2348   Dressing Type Transparent 09/28/23 2348   Line Status Flushed;Saline locked 09/28/23 2348   Dressing Status Clean;Dry; Intact 09/28/23 2348       Vitals   Vitals:    09/28/23 2343   BP: 135/79   TempSrc: Temporal   Pulse: 66   Resp: 18   Patient Position - Orthostatic VS: Lying   Temp: 98.4 °F (36.9 °C)       Physical Exam  Vitals and nursing note reviewed. Constitutional:       General: He is not in acute distress. Appearance: He is not diaphoretic. Comments: Pt somnolent, opens eyes briefly and smiles to voice   HENT:      Head: Normocephalic and atraumatic. Right Ear: External ear normal.      Left Ear: External ear normal.      Nose: Nose normal. No rhinorrhea. Mouth/Throat:      Mouth: Mucous membranes are moist.      Comments: No tongue fasciculations present. Eyes:      Extraocular Movements:      Right eye: No nystagmus. Left eye: No nystagmus. Comments: Unable to assess - pt somnolent   Cardiovascular:      Rate and Rhythm: Regular rhythm. Bradycardia present. Chest Wall: Thrill (mild, over aortic and tricuspid areas) present.       Pulses:           Dorsalis pedis pulses are 2+ on the right side and 2+ on the left side. Posterior tibial pulses are 2+ on the right side and 2+ on the left side. Heart sounds: Murmur (best heard over aortic and tricuspid areas) heard. Systolic murmur is present with a grade of 4/6. No friction rub. No gallop. Pulmonary:      Effort: Pulmonary effort is normal. No respiratory distress. Breath sounds: Normal breath sounds. No wheezing, rhonchi or rales. Abdominal:      General: Bowel sounds are normal. There is no distension. Palpations: Abdomen is soft. Tenderness: There is no abdominal tenderness. There is no guarding or rebound. Musculoskeletal:         General: No swelling. Cervical back: Neck supple. Right lower leg: No edema. Left lower leg: No edema. Skin:     General: Skin is warm and dry. Findings: No rash. Neurological:      General: No focal deficit present. Mental Status: He is oriented to person, place, and time. GCS: GCS eye subscore is 3. GCS verbal subscore is 5. GCS motor subscore is 5. Cranial Nerves: No dysarthria or facial asymmetry. Motor: No tremor. Comments: No tremor present at rest.   Psychiatric:         Attention and Perception: He does not perceive auditory or visual hallucinations. Mood and Affect: Mood is not anxious. Behavior: Behavior is cooperative. DATA    EKG, Pathology, and Other Studies: I have personally reviewed pertinent reports. EKG: Sinus rhythm with marked sinus arrhythmia, Rightward axis, Incomplete right bundle branch block. QTc 460. Lab Results: I have personally reviewed pertinent reports.         CBC ETOH     Lab Results   Component Value Date    WBC 8.31 09/28/2023    RBC 5.06 09/28/2023    HGB 14.9 09/28/2023    HCT 43.2 09/28/2023    MCV 85 09/28/2023    MCH 29.4 09/28/2023    MCHC 34.5 09/28/2023    RDW 12.1 09/28/2023     09/28/2023    MPV 9.5 09/28/2023      No results found for: "LACTICACID"   CMP UA         Component Value Date/Time    K 3.5 09/28/2023 1143     09/28/2023 1143    CO2 26 09/28/2023 1143    BUN 7 09/28/2023 1143    CREATININE 0.79 09/28/2023 1143         Component Value Date/Time    CALCIUM 10.2 09/28/2023 1143    ALKPHOS 51 09/28/2023 1143    AST 14 09/28/2023 1143    ALT 14 09/28/2023 1143      Lab Results   Component Value Date    CLARITYU Clear 09/28/2023    COLORU Yellow 09/28/2023    SPECGRAV 1.019 09/28/2023    PHUR 8.0 09/28/2023    GLUCOSEU Negative 09/28/2023    KETONESU 80 (3+) (A) 09/28/2023    BLOODU Negative 09/28/2023    PROTEIN UA Trace (A) 09/28/2023    NITRITE Negative 09/28/2023    BILIRUBINUR Negative 09/28/2023    UROBILINOGEN 3.0 (A) 09/28/2023    UROBILINOGEN 0.2 08/22/2021    LEUKOCYTESUR Negative 09/28/2023    WBCUA None Seen 09/28/2023    RBCUA None Seen 09/28/2023    BACTERIA None Seen 09/28/2023    EPIS Occasional 09/28/2023        Liver Function Test: ASA     Lab Results   Component Value Date    TBILI 0.66 09/28/2023    ALKPHOS 51 09/28/2023    AST 14 09/28/2023    ALT 14 09/28/2023    TP 7.9 09/28/2023    ALB 4.7 09/28/2023      No results found for: "SALICYLATE"   Troponin APAP     No results found for: "TROPONINI"   No results found for: "ACTMNPHEN"   VBG HCG     No results found for: "PHVEN", "SGH6KEZ", "PO2VEN", "WIH4SHB", "Priscila Dice", "N4YFCBFOE", "M2JTNJS"   No results found for: "HCGQUANT"   ABG Urine Drug Screen     No results found for: "PHART", "BNQ1PGB", "PO2ART", "OWM2HRN", "BEART", "[id number]", "O2HGB", "SOURC", "LAZARA", "VTAC", "ACRATE", "INSPIREDAIR", "PEEP"   No results found for: "AMPMETHUR", "Swati Outhouse", "Sharene Latch", "Kevin Crape", "Dominique Dilia", "OPIATEUR", "PCPUR", "THCUR", "OXYCODONEUR"   Lactate INR     No results found for: "LACTICACID"   No results found for: "INR"   PTT Protime     No results found for: "PTT"   No results found for: "PROTIME"   Hepatitis HIV     No results found for: "HEPBSAG", "HEPCAB" No results found for: "[id number]", "[id number]"         Imaging Studies: I have personally reviewed pertinent reports.  - US scrotum and testicles from the ED (normal US)        Counseling / Coordination of Care  Total floor / unit time spent today 45 minutes. Greater than 50% of total time was spent with the patient and / or family counseling and / or coordination of care. ** Please Note: This note has been constructed using a voice recognition system.  **

## 2023-09-29 NOTE — ASSESSMENT & PLAN NOTE
· Patient with a history of chronic opioid use  · Last use was over 9/27 at 0500  · Initiate MAT/opioid withdrawal protocol with plan for eventual microinduction with Suboxone  · Currently pt is resting comfortably after receiving supportive care in the ED, VSS, and pt in no acute distress due to opioid withdrawal sx  · Initiate Butrans 20 mcg/hr patch   · Continue monitoring opioid withdrawal, and plan for microinduction likely in the AM when more alert, as >36 hours have already passed since pt's last opioid use  · Symptomatic and supportive care  · Continuous pulse oximetry monitoring

## 2023-09-29 NOTE — SOCIAL WORK
SW attempted to meet with pt to complete intake assessment. Pt stated he is not feeling well and requested to complete assessment later.

## 2023-09-29 NOTE — ED NOTES
Trans to [facility] detox  Accepting dr Helen Box p/u [phone] for report     Chan Rey RN  09/28/23 2127

## 2023-09-29 NOTE — ASSESSMENT & PLAN NOTE
· Patient with a history of chronic fentanyl use  · Uses about 5 bags daily via smoking  · Denies h/o IVDU  · History of prior Suboxone MAT  · Management of opioid withdrawal under MAT protocol as above  · Currently tolerating maintenance suboxone 8 mg BID- continue   · Currently does not have insurance- will need Zubsolv on discharge   · Case management consulted for assistance with aftercare resources- interested in discharge to inpatient rehab at this time

## 2023-09-29 NOTE — UTILIZATION REVIEW
Initial Clinical Review    Pt initially presented to [facility] ED. Pt was transferred by EMS to [facility] for its Level IV medically managed intensive inpatient detox unit, not available at [facility]. Admission: Date/Time/Statement:   Admission Orders (From admission, onward)     Ordered        09/28/23 2348  Inpatient Admission  Once                      Orders Placed This Encounter   Procedures   • Inpatient Admission     Standing Status:   Standing     Number of Occurrences:   1     Order Specific Question:   Level of Care     Answer:   Med Surg [16]     Order Specific Question:   Estimated length of stay     Answer:   More than 2 Midnights     Order Specific Question:   Certification     Answer:   I certify that inpatient services are medically necessary for this patient for a duration of greater than two midnights. See H&P and MD Progress Notes for additional information about the patient's course of treatment. Initial Presentation: 29 y.o. male who presented to medical detox. Inpatient admission for evaluation and treatment of acute opioid withdrawal. Presented w/ request for detox from opioid. Uses 5 bags fentanyl via smoking daily, last use 9/27 @ 0500. On exam, abdominal tenderness, guarding, tremors. COWS 14. Plan: COWS monitoring w/ symptomatic supportive care, Butrans patch, IVF, micro induction of Suboxone when clinically indicated, telemetry, continuous pulse ox, Trend labs, replete electrolytes as needed. Continue PTA meds. Date: 09/29/23 Day 2: On exam, somnolent, thrill over aortic and tricsupid areas, murmur, bradycardia, GCS13. COWS 7. Plan: continue COWS monitoring w/ symptomatic supportive care, Butrans patch, micro induction of Suboxone to start this morning, telemetry, continuous pulse ox, Trend labs, replete electrolytes as needed. Continue PTA meds.        Wt Readings from Last 1 Encounters:   09/28/23 72.6 kg (160 lb)     Vital Signs: Date/Time Temp Pulse Resp BP MAP (mmHg) SpO2 O2 Device   09/29/23 0725 99.1 °F (37.3 °C) 78 18 124/65 84 99 % None (Room air)   09/29/23 0502 98.2 °F (36.8 °C) 60 18 135/87 -- 100 % None (Room air)   09/28/23 2343 98.4 °F (36.9 °C) 66 18 135/79 97 100 % None (Room air)       Clinical Opioid Withdrawal Scale (COWS):    09/29/23 0800 09/28/23 2344   Resting Pulse Rate: Measured After Patient is Sitting or Lying for One Minute 0  -LL 0  -NR   GI Upset: Over Last Half Hour 1  -LL 1  -NR   Sweating: Over Past Half Hour Not Accounted for by Room Temperature of Patient Activity 0  -LL 1  -NR   Tremor: Observation of Outstretched Hands 1  -LL 2  -NR   Restlessness: Observation During Assessment 1  -LL 1  -NR   Yawning: Observation During Assessment 0  -LL 1  -NR   Pupil Size 0  -LL 0  -NR   Anxiety and Irritability 1  -LL 2  -NR   Bone or Joint Aches: If Patient was Having Pain Previously, Only the Additional Component Attributed to Opiate Withdrawal is Scored 2  -LL 2  -NR   Gooseflesh Skin 0  -LL 3  -NR   Runny Nose or Tearing: Not Accounted for by Cold Symptoms or Allergies 1  -LL 1  -NR   Clinical Opiate Withdrawal Scale Total Score 7  -LL 14  -NR         Pertinent Labs/Diagnostic Test Results:   Results from last 7 days   Lab Units 09/29/23  0520 09/28/23  1143   WBC Thousand/uL 8.00 8.31   HEMOGLOBIN g/dL 14.0 14.9   HEMATOCRIT % 41.1 43.2   PLATELETS Thousands/uL 260 293   NEUTROS ABS Thousands/µL  --  6.85         Results from last 7 days   Lab Units 09/29/23  0520 09/28/23  1143   SODIUM mmol/L 140 142   POTASSIUM mmol/L 3.4* 3.5   CHLORIDE mmol/L 105 106   CO2 mmol/L 25 26   ANION GAP mmol/L 10 10   BUN mg/dL 10 7   CREATININE mg/dL 0.76 0.79   EGFR ml/min/1.73sq m 124 122   CALCIUM mg/dL 9.2 10.2   MAGNESIUM mg/dL 2.0 2.0     Results from last 7 days   Lab Units 09/28/23  1143   AST U/L 14   ALT U/L 14   ALK PHOS U/L 51   TOTAL PROTEIN g/dL 7.9   ALBUMIN g/dL 4.7   TOTAL BILIRUBIN mg/dL 0.66         Results from last 7 days   Lab Units 09/29/23  0520 09/28/23  1143   GLUCOSE RANDOM mg/dL 120 107     Results from last 7 days   Lab Units 09/28/23  1632   CLARITY UA  Clear   COLOR UA  Yellow   SPEC GRAV UA  1.019   PH UA  8.0   GLUCOSE UA mg/dl Negative   KETONES UA mg/dl 80 (3+)*   BLOOD UA  Negative   PROTEIN UA mg/dl Trace*   NITRITE UA  Negative   BILIRUBIN UA  Negative   UROBILINOGEN UA (BE) mg/dl 3.0*   LEUKOCYTES UA  Negative   WBC UA /hpf None Seen   RBC UA /hpf None Seen   BACTERIA UA /hpf None Seen   EPITHELIAL CELLS WET PREP /hpf Occasional   MUCUS THREADS  Occasional*       Present on Admission:  • Opioid withdrawal (HCC)  • Opioid use disorder, severe, dependence (HCC)  • Ketonuria  • Tobacco use disorder      Admitting Diagnosis: Opiate withdrawal ([address]) [F11.93]  Age/Sex: 29 y.o. male  Admission Orders:  Regular Diet. SCDs. COWS monitoring. Telemetry & Continuous Pulse Ox. Scheduled Medications:  buprenorphine, 0.5 mg, Sublingual, Q2H x4  multivitamin-minerals, 1 tablet, Oral, Daily  nicotine, 1 patch, Transdermal, Daily  transdermal buprenorphine, 20 mcg, Transdermal, Q7 Days    Continuous IV Infusions: none    PRN Meds:  acetaminophen, 650 mg, Oral, Q6H PRN; 9/29 x1  aluminum-magnesium hydroxide-simethicone, 30 mL, Oral, Q6H PRN; 9/29 x1  clonazePAM, 1 mg, Oral, Q6H PRN  cloNIDine, 0.1 mg, Oral, Q6H PRN  gabapentin, 300 mg, Oral, Q8H PRN; 9/29 x1  loperamide, 2 mg, Oral, Q4H PRN  ondansetron, 4 mg, Intravenous, Q6H PRN; 9/29 x1  traZODone, 50 mg, Oral, HS PRN          IP CONSULT TO CASE MANAGEMENT    Network Utilization Review Department  ATTENTION: Please call with any questions or concerns to [phone] and carefully listen to the prompts so that you are directed to the right person.  All voicemails are confidential.  Chet Aguero all requests for admission clinical reviews, approved or denied determinations and any other requests to dedicated fax number below belonging to the campus where the patient is receiving treatment.  List of dedicated fax numbers for the Facilities:  [place] DENIALS (Administrative/Medical Necessity) [phone] [address] (Maternity/NICU/Pediatrics) [phone]   [address] [place] [address] [phone]   [facility] [address] [facility] [phone] 150[address] [facility] 207 [facility] [address] [phone]   [address] [address] [phone]

## 2023-09-29 NOTE — ASSESSMENT & PLAN NOTE
· Patient with a history of chronic fentanyl use  · Uses about 5 bags daily via smoking  · Denies h/o IVDU  · History of prior Suboxone MAT  · Management of opioid withdrawal under MAT protocol as above  · Case management consulted for assistance with aftercare resources - likely OP MAT after d/c

## 2023-09-29 NOTE — ASSESSMENT & PLAN NOTE
· Pt with 3+ ketonuria on initial screening workup in the ED  · No current urinary complaints and normal anion gap on CMP  · He is in need of dyslipidemia and diabetes mellitus screening per chart review and does not appear to have sufficient PCP f/u  · Lipid panel and Hgb A1c ordered for the AM

## 2023-09-29 NOTE — ASSESSMENT & PLAN NOTE
· Pt in need of dyslipidemia screening per chart review and does not appear to have sufficient PCP f/u at this time  · Lipid panel ordered for the AM  · Recommend OP f/u with PCP
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
## 2023-09-30 PROCEDURE — 99233 SBSQ HOSP IP/OBS HIGH 50: CPT | Performed by: EMERGENCY MEDICINE

## 2023-09-30 RX ORDER — BUPRENORPHINE AND NALOXONE 8; 2 MG/1; MG/1
8 FILM, SOLUBLE BUCCAL; SUBLINGUAL ONCE
Status: COMPLETED | OUTPATIENT
Start: 2023-09-30 | End: 2023-09-30

## 2023-09-30 RX ADMIN — TRAZODONE HYDROCHLORIDE 50 MG: 50 TABLET ORAL at 21:14

## 2023-09-30 RX ADMIN — CLONAZEPAM 1 MG: 1 TABLET ORAL at 22:39

## 2023-09-30 RX ADMIN — BUPRENORPHINE AND NALOXONE 8 MG: 8; 2 FILM BUCCAL; SUBLINGUAL at 21:14

## 2023-09-30 RX ADMIN — CLONAZEPAM 1 MG: 1 TABLET ORAL at 16:38

## 2023-09-30 RX ADMIN — Medication 6 MG: at 21:14

## 2023-09-30 RX ADMIN — NICOTINE 1 PATCH: 21 PATCH, EXTENDED RELEASE TRANSDERMAL at 08:03

## 2023-09-30 RX ADMIN — NICOTINE POLACRILEX 4 MG: 4 GUM, CHEWING BUCCAL at 12:50

## 2023-09-30 RX ADMIN — NICOTINE POLACRILEX 4 MG: 4 GUM, CHEWING BUCCAL at 16:25

## 2023-09-30 RX ADMIN — BUPRENORPHINE AND NALOXONE 8 MG: 8; 2 FILM BUCCAL; SUBLINGUAL at 08:03

## 2023-09-30 RX ADMIN — GABAPENTIN 300 MG: 300 CAPSULE ORAL at 08:03

## 2023-09-30 RX ADMIN — BUPRENORPHINE AND NALOXONE 8 MG: 8; 2 FILM BUCCAL; SUBLINGUAL at 10:49

## 2023-09-30 NOTE — PLAN OF CARE
Problem: SUBSTANCE USE/ABUSE  Goal: By discharge, will develop insight into their chemical dependency and sustain motivation to continue in recovery  Description: INTERVENTIONS:  - Attends all daily group sessions and scheduled AA groups  - Actively practices coping skills through participation in the therapeutic community and adherence to program rules  - Reviews and completes assignments from individual treatment plan  - Assist patient development of understanding of their personal cycle of addiction and relapse triggers  9/29/2023 2205 by Silvia Orozco RN  Outcome: Progressing  9/29/2023 2158 by Silvia Orozco RN  Outcome: Progressing  Goal: By discharge, patient will have ongoing treatment plan addressing chemical dependency  Description: INTERVENTIONS:  - Assist patient with resources and/or appointments for ongoing recovery based living  9/29/2023 2205 by Silvia Orozco RN  Outcome: Progressing  9/29/2023 2158 by Silvia Orozco RN  Outcome: Progressing

## 2023-09-30 NOTE — CASE MANAGEMENT
CM met with pt to check in. Pt reported he would still like to go to Inpatient D&A rehab. Pt will need [facility] funding through [facility]. CM asked if pt was able to call [facility] D&A weekend line([phone]) to discuss [facility] funding. Pt reported he has the number on his table but did not get a chance to call as he reported "I was pretty medicated yesterday." Pt got on the phone to call them to discuss funding. CM informed pt that CM will follow up with him regarding his conversation with [facility] and next step in getting him into a rehab.      Pt reported he had been to [address] [place] [address] in the past and it would not be his first choice but pt reported "I know I don't have a choice, I can't be picky."

## 2023-09-30 NOTE — ASSESSMENT & PLAN NOTE
• Patient with a history of chronic opioid use  • Last use was over 9/27 at 0500  • MAT/opioid withdrawal protocol followed  ? Patient likely experienced precipitated during induction on 9/29  ?  Was on dexmedetomidine infusion from afternoon of 9/29  · Currently tolerating maintenance suboxone 8 mg BID   · Acute withdrawal resolved

## 2023-09-30 NOTE — CASE MANAGEMENT
Pt met with CM and was on phone with Heidy Ohara - on call worker from [facility] D&A ([phone]) Pt completed funding assessment and she reported she spoke to [employer] and they requested updated clinical on pt and asked CM to fax it to them at fax# [phone] Heidy hOara reported pt would be approved for funding to go to [facility], that [facility] would like to review pt's information. Pt signed APOORVA for [facility] D&A and [facility] for CM to fax documents. CM faxed for review at 4:15pm on 9/30/23. Pt asked if he can dc today if they have a bed but pt was informed that the paperwork for the county funded would not be completed by today and that tomorrow is the earliest depending on when they have a bed. Pt verbalized understanding that he will not dc today and CM will follow up with [facility] and [facility] D&A tomorrow regarding if he is approved for [facility] and [id number] [place] [address].

## 2023-09-30 NOTE — ASSESSMENT & PLAN NOTE
· Likely due to poor nutrition  · A1C WNL  · Patient tolerating PO now  · Routine outpatient f/u PCP

## 2023-09-30 NOTE — PROGRESS NOTES
PROGRESS NOTE  DEPARTMENT OF MEDICAL TOXICOLOGY  LEVEL 4 MEDICAL DETOX UNIT  Ximena Palomino 29 y.o. male MRN: [id number]  Unit/Bed#: [address] [place] [address] [id number] Encounter: [id number]      Reason for Admission/Principal Problem: Opioid withdrawal    Rounding Provider: Lloyd Johns MD  Attending Provider: Lloyd Johns MD   9/28/2023 11:39 PM           Tobacco use disorder  Assessment & Plan  · Pt reports smoking about 1 ppd of cigarettes  · Offered and accepted NRT   · Encourage cessation    Ketonuria  Assessment & Plan  · Likely due to poor nutrition  · Patient tolerating PO now    Opioid use disorder, severe, dependence ([address])  Assessment & Plan  · Patient with a history of chronic fentanyl use  · Uses about 5 bags daily via smoking  · Denies h/o IVDU  · History of prior Suboxone MAT  · Management of opioid withdrawal under MAT protocol as above  · Case management consulted for assistance with aftercare resources    Need for lipid screening  Assessment & Plan  · Lipid panel normal    Diabetes mellitus screening  Assessment & Plan  · Hgb A1c normal    * Opioid withdrawal ([address])  Assessment & Plan  · After precipitated withdrawal yesterday, patient was able to tolerate 8 mg Suboxone today without complication  · Withdrawal improved after the 8 mg of Suboxone but still having some withdrawal and we will administer another 8 mg of Suboxone now  · Plan to then start Suboxone maintenance dosing of 8 mg BID  · Continue clonazepam PRN for likely concomitant xylazine withdrawal          VTE Pharmacologic Prophylaxis:   Pharmacologic: Enoxaparin (Lovenox)  Mechanical VTE Prophylaxis in Place: yes    Code Status: Level 1 - Full Code    Patient Centered Rounds: I have performed bedside rounds with nursing staff today. Discussions with Specialists or Other Care Team Provider: Case management     Education and Discussions with Family / Patient: Discussed with patient    Time Spent for Care: 30 minutes.   More than 50% of total time spent on counseling and coordination of care as described above. Current Length of Stay: 2 day(s)    Current Patient Status: Inpatient     Certification Statement: The patient will continue to require additional inpatient hospital stay due to Opioid withdrawal Discharge Plan: Case management consulted for assistance with disposition when patient medically stable        Subjective:   Patient experienced precipitated withdrawal yesterday, this morning was feeling better and Precedex was discontinued. Patient was able to tolerate 8 mg Suboxone without difficulty. Still have some residual withdrawal symptoms but improved after the first dose of Suboxone. Patient feels well enough this morning to shower and walk around the unit.     Objective:     Clinical Opiate Withdrawal Scale  Pulse: 64  Resting Pulse Rate: Measured After Patient is Sitting or Lying for One Minute: Pulse rate 80 or below  GI Upset: Over Last Half Hour: Stomach cramps  Sweating: Over Past Half Hour Not Accounted for by Room Temperature of Patient Activity: No report of chills or flushing  Tremor: Observation of Outstretched Hands: Tremor can be felt, but not observed  Restlessness: Observation During Assessment: Reports difficulty sitting still, but is able to do so  Yawning: Observation During Assessment: No yawning  Pupil Size: Pupils pinned or normal size for room light  Anxiety and Irritability: Patient reports increasing irritability or anxiousness  Bone or Joint Aches: If Patient was Having Pain Previously, Only the Additional Component Attributed to Opiate Withdrawal is Scored: Patient reports severe diffuse aching of joints/muscle  Gooseflesh Skin: Skin is smooth  Runny Nose or Tearing: Not Accounted for by Cold Symptoms or Allergies: Nasal stuffiness of unusually moist eyes  Clinical Opiate Withdrawal Scale Total Score: 7    No data recorded      Last 24 Hours Medication List:   Current Facility-Administered Medications   Medication Dose Route Frequency Provider Last Rate   • acetaminophen  650 mg Oral Q6H PRN Sandhya Plaza PA-C     • aluminum-magnesium hydroxide-simethicone  30 mL Oral Q6H PRN Sandhya Rasmussen ISADORA Plaza     • buprenorphine-naloxone  8 mg Sublingual BID Kim Watkins DO     • buprenorphine-naloxone  8 mg Sublingual Once Hari Suazo MD     • clonazePAM  1 mg Oral Q6H PRN Sandhya Plaza PA-C     • melatonin  6 mg Oral HS PRN Satinder Gutierrez PA-C     • multivitamin-minerals  1 tablet Oral Daily [place] Plaza PA-C     • nicotine  1 patch Transdermal Daily Sandhya Gilliamon ISADORA Plaza     • nicotine polacrilex  4 mg Oral Q2H PRN Satinder Gutierrez PA-C     • ondansetron  4 mg Intravenous Q6H PRN Sandhya Valery ISADORA Plaza     • traZODone  50 mg Oral HS PRN Sandhya Gilliamon ISADORA Plaza           Vitals:   Temp (24hrs), Av.6 °F (37 °C), Min:97.5 °F (36.4 °C), Max:98.9 °F (37.2 °C)    Temp:  [97.5 °F (36.4 °C)-98.9 °F (37.2 °C)] 97.5 °F (36.4 °C)  HR:  [40-82] 64  Resp:  [18] 18  BP: (119-143)/(70-77) 119/72  SpO2:  [97 %-99 %] 98 %  Body mass index is 23.63 kg/m². Input and Output Summary (last 24 hours):No intake or output data in the 24 hours ending 23 1042    Physical Exam:   Physical Exam  Vitals reviewed. Constitutional:       General: He is not in acute distress. HENT:      Head: Normocephalic. Mouth/Throat:      Mouth: Mucous membranes are moist.   Eyes:      Conjunctiva/sclera: Conjunctivae normal.   Cardiovascular:      Rate and Rhythm: Normal rate and regular rhythm. Heart sounds: No murmur heard. No friction rub. No gallop. Pulmonary:      Effort: Pulmonary effort is normal. No respiratory distress. Breath sounds: Normal breath sounds. Abdominal:      General: There is no distension. Palpations: Abdomen is soft. Tenderness: There is no abdominal tenderness. Musculoskeletal:      Cervical back: Neck supple.       Right lower leg: No edema. Left lower leg: No edema. Skin:     General: Skin is warm and dry. Neurological:      General: No focal deficit present. Mental Status: He is alert. Comments: Mildly tremulous   Psychiatric:      Comments: Mildly anxious         Additional Data:     Labs:   Results from last 7 days   Lab Units 09/29/23  0520 09/28/23  1143   WBC Thousand/uL 8.00 8.31   HEMOGLOBIN g/dL 14.0 14.9   HEMATOCRIT % 41.1 43.2   PLATELETS Thousands/uL 260 293   NEUTROS PCT %  --  83*   LYMPHS PCT %  --  11*   MONOS PCT %  --  6   EOS PCT %  --  0      Results from last 7 days   Lab Units 09/29/23  0520 09/28/23  1143   SODIUM mmol/L 140 142   POTASSIUM mmol/L 3.4* 3.5   CHLORIDE mmol/L 105 106   CO2 mmol/L 25 26   BUN mg/dL 10 7   CREATININE mg/dL 0.76 0.79   ANION GAP mmol/L 10 10   CALCIUM mg/dL 9.2 10.2   ALBUMIN g/dL  --  4.7   TOTAL BILIRUBIN mg/dL  --  0.66   ALK PHOS U/L  --  51   ALT U/L  --  14   AST U/L  --  14   GLUCOSE RANDOM mg/dL 120 107                Results from last 7 days   Lab Units 09/29/23  0520   HEMOGLOBIN A1C % 5.5                * I Have Reviewed All Lab Data Listed Above. * Additional Pertinent Lab Tests Reviewed: [address] Admission Reviewed      Imaging Studies: I have personally reviewed pertinent reports. Recent Cultures (last 7 days): Today, Patient Was Seen By: Lidia Roca MD    ** Please Note: Dictation voice to text software may have been used in the creation of this document.  **

## 2023-09-30 NOTE — DISCHARGE SUMMARY
MEDICAL DETOX UNIT, LEVEL 4  Department of Medical Toxicology  Reason for Admission/Principal Problem: Opioid withdrawal   Admitting provider: ISADORA Mae MD   9/28/2023 11:39 PM     Discharging Physician / Practitioner: Haider Marcelino PA-C  PCP: No primary care provider on file. Admission Date:   Admission Orders (From admission, onward)     Ordered        09/28/23 2348  Inpatient Admission  Once                      Discharge Date: 10/01/23    Medical Problems     Resolved Problems  Date Reviewed: 9/29/2023          Resolved    Diabetes mellitus screening 10/1/2023     Resolved by  Krista Quiroga PA-C    Need for lipid screening 10/1/2023     Resolved by  Krista Quiroga PA-C    * (Principal) Opioid withdrawal ([address]) 10/1/2023     Resolved by  Krista Quiroga PA-C          * Opioid withdrawal (HCC)-resolved as of 10/1/2023  Assessment & Plan  • Patient with a history of chronic opioid use  • Last use was over 9/27 at 0500  • MAT/opioid withdrawal protocol followed  ? Patient likely experienced precipitated during induction on 9/29  ?  Was on dexmedetomidine infusion from afternoon of 9/29  · Currently tolerating maintenance suboxone 8 mg BID   · Acute withdrawal resolved     Opioid use disorder, severe, dependence ([address])  Assessment & Plan  · Patient with a history of chronic fentanyl use  · Uses about 5 bags daily via smoking  · Denies h/o IVDU  · History of prior Suboxone MAT  · Management of opioid withdrawal under MAT protocol as above  · Currently tolerating maintenance suboxone 8 mg BID- continue   · Case management consulted for assistance with aftercare resources- interested in discharge to inpatient rehab at this time     Tobacco use disorder  Assessment & Plan  · Pt reports smoking about 1 ppd of cigarettes  · Offered and accepted NRT   · Encourage cessation    Ketonuria  Assessment & Plan  · Likely due to poor nutrition  · A1C WNL  · Patient tolerating PO now  · Routine outpatient f/u PCP    Need for lipid screening-resolved as of 10/1/2023  Assessment & Plan  · Lipid panel normal  · Routine outpatient f/u PCP    Diabetes mellitus screening-resolved as of 10/1/2023  Assessment & Plan  · Hgb A1c normal  · Routine outpatient f/u PCP      Consultations During Hospital Stay:  · Case Management     Procedures Performed:   · Suboxone micro-induction    Significant Findings / Test Results:   · Ketonuria     Incidental Findings:   · None     Test Results Pending at Discharge (will require follow up):   · Hgb A1c   ·      Outpatient Tests/Follow ups Requested:  · PCP follow up    Complications:  None    Reason for Admission: Opioid withdrawal     Hospital Course:     Yovani Cabrera is a 29 y.o. male patient PMH OUD who originally presented to the hospital on 9/28/2023 due to opioid withdrawal.  Patient initially presented to the [facility] ED 9/28 requesting detoxification from opioids and agreed to initiation of Suboxone. Pt was subsequently admitted to the [address] [place] [address] Detox Unit for medically assisted opioid withdrawal and Suboxone microinduction. On admission, he was placed on Butrans patch 20 mcg to slowly introduce Suboxone and prevent precipitated withdrawal.  On 9/29/23, patient underwent microinduction however was noted to have increased symptoms after receiving 8 mg dose of Suboxone. He was started on Precedex infusion with noted improvement. Patient was titrated off of Precedex and was stabilized on maintenance suboxone without further complications. Butrans patch subsequently removed. . Case management was consulted for assistance with aftercare resources, and patient will be discharged to [facility]. Please see above list of diagnoses and related plan for additional information. Condition at Discharge: good     Discharge Day Visit / Exam:     Subjective: No acute complaints. Denies further withdrawal symptoms.      Vitals: Blood Pressure: 133/70 (09/30/23 2000)  Pulse: 60 (09/30/23 2000)  Temperature: 97.9 °F (36.6 °C) (09/30/23 2000)  Temp Source: Temporal (09/30/23 2000)  Respirations: 18 (09/30/23 2000)  Height: 5' 9" (175.3 cm) (09/28/23 2343)  Weight - Scale: 72.6 kg (160 lb) (09/28/23 2343)  SpO2: 95 % (09/30/23 2000)  Exam:   Physical Exam  Constitutional:       General: He is not in acute distress. Appearance: He is not diaphoretic. Eyes:      General: No scleral icterus. Pupils: Pupils are equal, round, and reactive to light. Cardiovascular:      Rate and Rhythm: Normal rate and regular rhythm. Pulmonary:      Breath sounds: Normal breath sounds. Abdominal:      General: Bowel sounds are normal.      Palpations: Abdomen is soft. Neurological:      Mental Status: He is alert and oriented to person, place, and time. Discussion with Family: I personally did not discuss patient with family at this time. Discussed current plan with patient, answered all questions to best of my ability. Discharge instructions/Information to patient and family:   See after visit summary for information provided to patient and family. Provisions for Follow-Up Care:  See after visit summary for information related to follow-up care and any pertinent home health orders. Disposition:     Home    For Discharges to [facility] SNF:   · Not Applicable to this Patient - Not Applicable to this Patient    Planned Readmission: None     Discharge Statement:  I spent 45 minutes discharging the patient. This time was spent on the day of discharge. I had direct contact with the patient on the day of discharge. Greater than 50% of the total time was spent examining patient, answering all patient questions, arranging and discussing plan of care with patient as well as directly providing post-discharge instructions. Additional time then spent on discharge activities.     Discharge Medications:  See after visit summary for reconciled discharge medications provided to patient and family.       ** Please Note: This note has been constructed using a voice recognition system **

## 2023-10-01 PROBLEM — Z13.220 NEED FOR LIPID SCREENING: Status: RESOLVED | Noted: 2018-08-10 | Resolved: 2023-10-01

## 2023-10-01 PROBLEM — Z13.1 DIABETES MELLITUS SCREENING: Status: RESOLVED | Noted: 2018-08-10 | Resolved: 2023-10-01

## 2023-10-01 PROBLEM — F11.93 OPIOID WITHDRAWAL (HCC): Status: RESOLVED | Noted: 2023-09-28 | Resolved: 2023-10-01

## 2023-10-01 LAB
ALBUMIN SERPL BCP-MCNC: 4.1 G/DL (ref 3.5–5)
ALP SERPL-CCNC: 54 U/L (ref 34–104)
ALT SERPL W P-5'-P-CCNC: 10 U/L (ref 7–52)
ANION GAP SERPL CALCULATED.3IONS-SCNC: 9 MMOL/L
AST SERPL W P-5'-P-CCNC: 10 U/L (ref 13–39)
BASOPHILS # BLD AUTO: 0.04 THOUSANDS/ÂΜL (ref 0–0.1)
BASOPHILS NFR BLD AUTO: 1 % (ref 0–1)
BILIRUB SERPL-MCNC: 0.32 MG/DL (ref 0.2–1)
BUN SERPL-MCNC: 16 MG/DL (ref 5–25)
CALCIUM SERPL-MCNC: 9.2 MG/DL (ref 8.4–10.2)
CHLORIDE SERPL-SCNC: 107 MMOL/L (ref 96–108)
CO2 SERPL-SCNC: 26 MMOL/L (ref 21–32)
CREAT SERPL-MCNC: 0.88 MG/DL (ref 0.6–1.3)
EOSINOPHIL # BLD AUTO: 0.37 THOUSAND/ÂΜL (ref 0–0.61)
EOSINOPHIL NFR BLD AUTO: 5 % (ref 0–6)
ERYTHROCYTE [DISTWIDTH] IN BLOOD BY AUTOMATED COUNT: 12.3 % (ref 11.6–15.1)
GFR SERPL CREATININE-BSD FRML MDRD: 116 ML/MIN/1.73SQ M
GLUCOSE SERPL-MCNC: 112 MG/DL (ref 65–140)
HCT VFR BLD AUTO: 43.9 % (ref 36.5–49.3)
HGB BLD-MCNC: 15.3 G/DL (ref 12–17)
IMM GRANULOCYTES # BLD AUTO: 0.02 THOUSAND/UL (ref 0–0.2)
IMM GRANULOCYTES NFR BLD AUTO: 0 % (ref 0–2)
LYMPHOCYTES # BLD AUTO: 3.54 THOUSANDS/ÂΜL (ref 0.6–4.47)
LYMPHOCYTES NFR BLD AUTO: 44 % (ref 14–44)
MAGNESIUM SERPL-MCNC: 2 MG/DL (ref 1.9–2.7)
MCH RBC QN AUTO: 30.1 PG (ref 26.8–34.3)
MCHC RBC AUTO-ENTMCNC: 34.9 G/DL (ref 31.4–37.4)
MCV RBC AUTO: 86 FL (ref 82–98)
MONOCYTES # BLD AUTO: 0.57 THOUSAND/ÂΜL (ref 0.17–1.22)
MONOCYTES NFR BLD AUTO: 7 % (ref 4–12)
NEUTROPHILS # BLD AUTO: 3.53 THOUSANDS/ÂΜL (ref 1.85–7.62)
NEUTS SEG NFR BLD AUTO: 43 % (ref 43–75)
NRBC BLD AUTO-RTO: 0 /100 WBCS
PLATELET # BLD AUTO: 274 THOUSANDS/UL (ref 149–390)
PMV BLD AUTO: 9.5 FL (ref 8.9–12.7)
POTASSIUM SERPL-SCNC: 3.6 MMOL/L (ref 3.5–5.3)
PROT SERPL-MCNC: 6.2 G/DL (ref 6.4–8.4)
RBC # BLD AUTO: 5.08 MILLION/UL (ref 3.88–5.62)
SODIUM SERPL-SCNC: 142 MMOL/L (ref 135–147)
WBC # BLD AUTO: 8.07 THOUSAND/UL (ref 4.31–10.16)

## 2023-10-01 PROCEDURE — 85025 COMPLETE CBC W/AUTO DIFF WBC: CPT | Performed by: PHYSICIAN ASSISTANT

## 2023-10-01 PROCEDURE — 83735 ASSAY OF MAGNESIUM: CPT | Performed by: PHYSICIAN ASSISTANT

## 2023-10-01 PROCEDURE — 80053 COMPREHEN METABOLIC PANEL: CPT | Performed by: PHYSICIAN ASSISTANT

## 2023-10-01 PROCEDURE — 99233 SBSQ HOSP IP/OBS HIGH 50: CPT | Performed by: PHYSICIAN ASSISTANT

## 2023-10-01 RX ORDER — HYDROXYZINE 50 MG/1
50 TABLET, FILM COATED ORAL EVERY 6 HOURS PRN
Qty: 30 TABLET | Refills: 0 | Status: CANCELLED | OUTPATIENT
Start: 2023-10-01

## 2023-10-01 RX ORDER — HYDROXYZINE 50 MG/1
50 TABLET, FILM COATED ORAL EVERY 6 HOURS PRN
Status: DISCONTINUED | OUTPATIENT
Start: 2023-10-01 | End: 2023-10-02 | Stop reason: HOSPADM

## 2023-10-01 RX ORDER — BUPRENORPHINE AND NALOXONE 8; 2 MG/1; MG/1
8 FILM, SOLUBLE BUCCAL; SUBLINGUAL 2 TIMES DAILY
Qty: 60 FILM | Refills: 0 | Status: CANCELLED | OUTPATIENT
Start: 2023-10-01 | End: 2023-10-31

## 2023-10-01 RX ORDER — GABAPENTIN 300 MG/1
300 CAPSULE ORAL 3 TIMES DAILY PRN
Status: DISCONTINUED | OUTPATIENT
Start: 2023-10-01 | End: 2023-10-02 | Stop reason: HOSPADM

## 2023-10-01 RX ADMIN — NICOTINE 1 PATCH: 21 PATCH, EXTENDED RELEASE TRANSDERMAL at 08:36

## 2023-10-01 RX ADMIN — BUPRENORPHINE AND NALOXONE 8 MG: 8; 2 FILM BUCCAL; SUBLINGUAL at 21:33

## 2023-10-01 RX ADMIN — TRAZODONE HYDROCHLORIDE 50 MG: 50 TABLET ORAL at 21:31

## 2023-10-01 RX ADMIN — HYDROXYZINE HYDROCHLORIDE 50 MG: 50 TABLET, FILM COATED ORAL at 12:10

## 2023-10-01 RX ADMIN — NICOTINE POLACRILEX 4 MG: 4 GUM, CHEWING BUCCAL at 12:10

## 2023-10-01 RX ADMIN — Medication 6 MG: at 21:30

## 2023-10-01 RX ADMIN — NICOTINE POLACRILEX 4 MG: 4 GUM, CHEWING BUCCAL at 21:31

## 2023-10-01 RX ADMIN — GABAPENTIN 300 MG: 300 CAPSULE ORAL at 21:31

## 2023-10-01 NOTE — CASE MANAGEMENT
CM contacted [facility] to follow up regarding patient's needs for Suboxone. [facility] will provide treatment, however patient must arrive with a 7 day supply. At this time ISADORA attempted to obtain a supply for patient to take with him to treatment and has been unsuccessful. Patient's prescription will be filled Monday 10/2/23 and patient can then plan to discharge on Monday.

## 2023-10-01 NOTE — QUICK NOTE
Notified by treatment team RN that patient refused maintenance dose of Suboxone. Upon further discussion with patient, patient states that he is no longer interested in long-term Suboxone MAT. States that he came to the hospital for help with his withdrawal symptoms but is no longer withdrawing and now does not want to be on Suboxone. Also states that he is now unsure if he wants to pursue inpatient rehab. Discussed with patient risks of ongoing fentanyl use and importance of staying on long-term Suboxone MAT. Patient states he is not interested in long-term Suboxone at this time. Discussed option of monthly Sublocade injection. Patient states he is unsure what he wants to do at this time and requests some time to think over his options. Will continue to monitor.

## 2023-10-01 NOTE — PROGRESS NOTES
[facility]  Progress Note  Name: Dwayne Kim  MRN: [id number]  Unit/Bed#: 5T DETOX 571-07 I Date of Admission: 9/28/2023   Date of Service: 10/1/2023 I Hospital Day: 3      MEDICAL DETOX UNIT, LEVEL 4  Department of Medical Toxicology  Reason for Admission/Principal Problem: opioid withdrawal   Rounding Provider: Yosi Carnes PA-C, Apolinar Richardson MD     * Opioid withdrawal (HCC)-resolved as of 10/1/2023  Assessment & Plan  • Patient with a history of chronic opioid use  • Last use was over 9/27 at 0500  • MAT/opioid withdrawal protocol followed  ? Patient likely experienced precipitated during induction on 9/29  ?  Was on dexmedetomidine infusion from afternoon of 9/29  · Currently tolerating maintenance suboxone 8 mg BID   · Acute withdrawal resolved     Opioid use disorder, severe, dependence ([address])  Assessment & Plan  · Patient with a history of chronic fentanyl use  · Uses about 5 bags daily via smoking  · Denies h/o IVDU  · History of prior Suboxone MAT  · Management of opioid withdrawal under MAT protocol as above  · Currently tolerating maintenance suboxone 8 mg BID- continue   · Currently does not have insurance- will need Zubsolv on discharge   · Case management consulted for assistance with aftercare resources- interested in discharge to inpatient rehab at this time     Tobacco use disorder  Assessment & Plan  · Pt reports smoking about 1 ppd of cigarettes  · Offered and accepted NRT   · Encourage cessation    Ketonuria  Assessment & Plan  · Likely due to poor nutrition  · A1C WNL  · Patient tolerating PO now  · Routine outpatient f/u PCP    Need for lipid screening-resolved as of 10/1/2023  Assessment & Plan  · Lipid panel normal  · Routine outpatient f/u PCP    Diabetes mellitus screening-resolved as of 10/1/2023  Assessment & Plan  · Hgb A1c normal  · Routine outpatient f/u PCP        VTE Pharmacologic Prophylaxis:   Pharmacologic: none- low risk and ambulatory   Mechanical VTE Prophylaxis in Place: yes    Code Status: Level 1 - Full Code    Patient Centered Rounds: I have performed bedside rounds with nursing staff today. Discussions with Specialists or Other Care Team Provider: Dr. Raheel Andrade, [facility]    Education and Discussions with Family / Patient: I personally did not discuss patient with family at this time. Discussed current plan with patient, answered all questions to best of my ability. Time Spent for Care: 20 minutes. More than 50% of total time spent on counseling and coordination of care as described above. Current Length of Stay: 3 day(s)    Current Patient Status: Inpatient     Certification Statement: The patient will continue to require additional inpatient hospital stay due to medically clear for inpatient rehab  Discharge Plan: inpatient rehab       Total time spent today 20 minutes. Greater than 50% of total time was spent with the patient and / or family counseling and / or coordination of care. A description of the counseling / coordination of care: OUD, suboxone     Subjective:   Patient seen and examined bedside this morning. Reports that he is feeling anxious today, but otherwise denies acute physical complaints. Currently denies headaches, lightheadedness/dizziness, coughing/sneezing/congestion/rhinorrhea, chest pain, SOB/dyspnea, abdominal pain, N/V/C/D. Tolerating maintenance Suboxone and remains interested in inpatient rehab.      Objective:     Clinical Opiate Withdrawal Scale  Pulse: 58  Resting Pulse Rate: Measured After Patient is Sitting or Lying for One Minute: Pulse rate 80 or below  GI Upset: Over Last Half Hour: Stomach cramps  Sweating: Over Past Half Hour Not Accounted for by Room Temperature of Patient Activity: No report of chills or flushing  Tremor: Observation of Outstretched Hands: Tremor can be felt, but not observed  Restlessness: Observation During Assessment: Reports difficulty sitting still, but is able to do so  Yawning: Observation During Assessment: No yawning  Pupil Size: Pupils pinned or normal size for room light  Anxiety and Irritability: Patient reports increasing irritability or anxiousness  Bone or Joint Aches: If Patient was Having Pain Previously, Only the Additional Component Attributed to Opiate Withdrawal is Scored: Patient reports severe diffuse aching of joints/muscle  Gooseflesh Skin: Skin is smooth  Runny Nose or Tearing: Not Accounted for by Cold Symptoms or Allergies: Nasal stuffiness of unusually moist eyes  Clinical Opiate Withdrawal Scale Total Score: 7    No data recorded      Last 24 Hours Medication List:   Current Facility-Administered Medications   Medication Dose Route Frequency Provider Last Rate   • acetaminophen  650 mg Oral Q6H PRN Sandhya Moody PA-C     • aluminum-magnesium hydroxide-simethicone  30 mL Oral Q6H PRN Sandhya Manavbyron Moody PA-C     • buprenorphine-naloxone  8 mg Sublingual BID Halbert Nageotte Nappe, DO     • hydrOXYzine HCL  50 mg Oral Q6H PRN Audrey Quiroga PA-C     • melatonin  6 mg Oral HS PRN Sweta Geiger PA-C     • multivitamin-minerals  1 tablet Oral Daily Sandhya Moody PA-C     • nicotine  1 patch Transdermal Daily Sandhya Moody PA-C     • nicotine polacrilex  4 mg Oral Q2H PRN Sweta Geiger PA-C     • ondansetron  4 mg Intravenous Q6H PRN Sandhya Moody PA-C     • traZODone  50 mg Oral HS PRN Sandhya Moody PA-C           Vitals:   Temp (24hrs), Av °F (36.7 °C), Min:97.9 °F (36.6 °C), Max:98.1 °F (36.7 °C)    Temp:  [97.9 °F (36.6 °C)-98.1 °F (36.7 °C)] 98.1 °F (36.7 °C)  HR:  [58-60] 58  Resp:  [15-18] 15  BP: (118-133)/(70-72) 118/72  SpO2:  [95 %-96 %] 96 %  Body mass index is 23.63 kg/m². Input and Output Summary (last 24 hours):No intake or output data in the 24 hours ending 10/01/23 1203    Physical Exam:   Physical Exam  Vitals and nursing note reviewed.    Constitutional:       General: He is not in acute distress. Appearance: Normal appearance. He is well-developed and normal weight. He is not ill-appearing or diaphoretic. HENT:      Head: Normocephalic and atraumatic. Eyes:      General: No scleral icterus. Conjunctiva/sclera: Conjunctivae normal.   Cardiovascular:      Rate and Rhythm: Normal rate and regular rhythm. Heart sounds: Normal heart sounds. No murmur heard. No friction rub. No gallop. Pulmonary:      Effort: Pulmonary effort is normal. No respiratory distress. Breath sounds: Normal breath sounds. No wheezing, rhonchi or rales. Abdominal:      General: Abdomen is flat. Bowel sounds are normal. There is no distension. Palpations: Abdomen is soft. Tenderness: There is no abdominal tenderness. There is no guarding. Musculoskeletal:         General: No swelling. Cervical back: Normal range of motion and neck supple. Skin:     General: Skin is warm and dry. Comments: No piloerection   Neurological:      General: No focal deficit present. Mental Status: He is alert and oriented to person, place, and time. Mental status is at baseline. Motor: No tremor. Psychiatric:         Attention and Perception: Attention normal.         Mood and Affect: Mood is anxious. Speech: Speech normal.         Behavior: Behavior is cooperative.        Additional Data:     Labs: keep all most recent labs as listed on admission templates   Results from last 7 days   Lab Units 09/29/23  0520 09/28/23  1143   WBC Thousand/uL 8.00 8.31   HEMOGLOBIN g/dL 14.0 14.9   HEMATOCRIT % 41.1 43.2   PLATELETS Thousands/uL 260 293   NEUTROS PCT %  --  83*   LYMPHS PCT %  --  11*   MONOS PCT %  --  6   EOS PCT %  --  0      Results from last 7 days   Lab Units 09/29/23  0520 09/28/23  1143   SODIUM mmol/L 140 142   POTASSIUM mmol/L 3.4* 3.5   CHLORIDE mmol/L 105 106   CO2 mmol/L 25 26   BUN mg/dL 10 7   CREATININE mg/dL 0.76 0.79   ANION GAP mmol/L 10 10   CALCIUM mg/dL 9.2 10.2   ALBUMIN g/dL  --  4.7   TOTAL BILIRUBIN mg/dL  --  0.66   ALK PHOS U/L  --  51   ALT U/L  --  14   AST U/L  --  14   GLUCOSE RANDOM mg/dL 120 107                Results from last 7 days   Lab Units 09/29/23  0520   HEMOGLOBIN A1C % 5.5               * I Have Reviewed All Lab Data Listed Above. * Additional Pertinent Lab Tests Reviewed: All Labs Within Last 24 Hours Reviewed      Imaging Studies: I have personally reviewed pertinent reports. Recent Cultures (last 7 days): Today, Patient Was Seen By: Arnol Mattson PA-C    ** Please Note: Dictation voice to text software may have been used in the creation of this document.  **

## 2023-10-01 NOTE — CASE MANAGEMENT
CM followed up with patient and inquired if called [facility] to complete updated assessment. Patient stated that he called and they did not answer. Patient left a voicemail message. CM encouraged patient to call again.

## 2023-10-01 NOTE — CASE MANAGEMENT
CM called [facility] to confirm that clinical paperwork was received and that patient was accepted. CM was informed that clinical paperwork was received, however patient needed to contact [facility] and re-do his phone assessment, as the one completed on 9/25/23 was no longer valid. CM was informed that assessments are only valid for 3 days. CM met with patient and provided the update and the phone number for [facility].

## 2023-10-01 NOTE — CASE MANAGEMENT
CM went to meet with patient and follow up on the call to [facility]. Patient was supposed to call and complete phone assessment. CM found patient's phone on the floor across the room by the door. CM picked up the phone and placed it on the tray next to patient. Patient stated that he threw the phone out of frustration. Patient stated that he was not sure if he still wanted to attend Rehab., and that his family is asking him to attend Rehab. Patient stated that he was confused at this time as to what he wanted to do. Patient requested medication to help manage his feelings. Patient stated that he would take Suboxone if necessary to help. CM informed PA-C and provided an update. PA-C will provide patient with more education on medications.

## 2023-10-02 VITALS
OXYGEN SATURATION: 97 % | HEART RATE: 71 BPM | BODY MASS INDEX: 23.7 KG/M2 | HEIGHT: 69 IN | RESPIRATION RATE: 16 BRPM | SYSTOLIC BLOOD PRESSURE: 131 MMHG | WEIGHT: 160 LBS | TEMPERATURE: 97.6 F | DIASTOLIC BLOOD PRESSURE: 82 MMHG

## 2023-10-02 PROCEDURE — 99239 HOSP IP/OBS DSCHRG MGMT >30: CPT

## 2023-10-02 RX ADMIN — BUPRENORPHINE AND NALOXONE 8 MG: 8; 2 FILM BUCCAL; SUBLINGUAL at 09:20

## 2023-10-02 RX ADMIN — NICOTINE 1 PATCH: 21 PATCH, EXTENDED RELEASE TRANSDERMAL at 09:21

## 2023-10-02 NOTE — DISCHARGE INSTR - OTHER ORDERS
CRISIS INFORMATION  Mental Health Matters! Help is available. Please call. Mental Health Crisis Intervention and Suicide Prevention Hot Line: Dial 988  [facility] Crisis number: 138-436-6909 or toll free: [phone] - 24-Hour Crisis & Emergency Services  Telephone Crisis Intervention is available 24 hours a day 7 days a week. Mobile Crisis Intervention is available to be dispatched to the three [facility] during certain times and can be requested at the same numbers. There is also a crisis residence available for those who need that level of care.   [employer]:  702671    Drug and Alcohol Services  For information on how to access Drug and Alcohol treatment services please contact:  After hours 24/7 number:  [facility] at [phone]  During regular business hours call:  [facility]  [address] [facility], [address] [place] [address]  Phone: [phone]    [facility] ([address])  [address] [facility], [address]  Phone: [phone]  [address] [facility], [address] [place] [address] Dr  New Admissions - [phone]  Local Office - [phone]   [place] [address] [address]  Phone [phone]

## 2023-10-02 NOTE — PLAN OF CARE
Problem: SUBSTANCE USE/ABUSE  Goal: By discharge, will develop insight into their chemical dependency and sustain motivation to continue in recovery  Description: INTERVENTIONS:  - Attends all daily group sessions and scheduled AA groups  - Actively practices coping skills through participation in the therapeutic community and adherence to program rules  - Reviews and completes assignments from individual treatment plan  - Assist patient development of understanding of their personal cycle of addiction and relapse triggers  Outcome: Progressing

## 2023-10-02 NOTE — PLAN OF CARE
Problem: SUBSTANCE USE/ABUSE  Goal: By day 5 will complete medical detox and be discharged with an appropriate treatment plan in place. Description: INTERVENTIONS:  1. Assess for Chemical Dependency and determine treatment needs per ASAM criteria  2.  Develop discharge plan with patient  Outcome: Adequate for Discharge

## 2023-10-02 NOTE — NURSING NOTE
Discharge instructions given both written and verbally. Denies questions about f/u or medications. Personal belongings returned to patient. Patient dressed in street clothes and escorted to the lobby for transport to [facility].

## 2023-10-02 NOTE — CASE MANAGEMENT
Case Management Discharge Planning Note    Patient name Alberta Saravia  Location 5T DETOX 513/5T DETOX 51* MRN [id number]  : 1995 Date 10/2/2023       Current Admission Date: 2023  Current Admission Diagnosis:Opioid use disorder, severe, dependence [facility])   Patient Active Problem List    Diagnosis Date Noted   • Opioid use disorder, severe, dependence ([address]) 2023   • Ketonuria 2023   • Tobacco use disorder 2023   • Occasional tremors 08/10/2018   • Abdominal cramps 08/10/2018      LOS (days): 4  Geometric Mean LOS (GMLOS) (days): 3.30  Days to GMLOS:-0.3     OBJECTIVE:  Risk of Unplanned Readmission Score: 7.68         Current admission status: Inpatient   Preferred Pharmacy:   [facility]/pharmacy #[id number]- [place] [address]  [address] [facility] [address]  Phone: [phone] Fax: [phone]    [address] [place] [address], [address] [place] [address],  [address] [facility] [address] [facility]  Phone: [phone] Fax: [address] [facility],  [address], [address] [facility] [address] [facility] [address] [facility]  Phone: [phone] Fax: [phone] [address] [facility]  [address] [facility] [address]  Phone: [phone] Fax: [phone]    Primary Care Provider: No primary care provider on file. Primary Insurance:   Secondary Insurance:     DISCHARGE DETAILS:  Per medical provider, pt medically cleared for discharge today. Pt was referred and accepted to [facility]. Pt's transportation scheduled for 1500 with [facility]. Pt provided with prescriptions. Pt advised he will call his mother to notify of pickup time and declined for SW to call to notify family.    Discharge planning discussed with[de-identified] Georges Cam of Choice: Yes                   Contacts  Patient Contacts: Alejandrina Pinch  Relationship to Patient[de-identified] Family  Contact Method: Phone  Phone Number: [phone]  Reason/Outcome: Continuity of Care, Emergency Contact, Discharge Planning              Other Referral/Resources/Interventions Provided:  Referrals Provided[de-identified] Crisis Hotline  Post Acute Placement to[de-identified] Substance Abuse Treatment    Would you like to participate in our [address] service program?  : Yes                   Transported by [employer] and Unit #): [facility]           Transfer Mode: Ambulate
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
